# Patient Record
Sex: MALE | Race: WHITE | Employment: FULL TIME | URBAN - METROPOLITAN AREA
[De-identification: names, ages, dates, MRNs, and addresses within clinical notes are randomized per-mention and may not be internally consistent; named-entity substitution may affect disease eponyms.]

---

## 2017-05-09 NOTE — TELEPHONE ENCOUNTER
Please call pt and let him know that he was to recheck his lipid. His last lipid test was in July of 2016 and was told to repeat in 6 months. He also has a pending CMP test in there as well that he can get done.  Labs are already in system that were put in

## 2017-05-11 NOTE — TELEPHONE ENCOUNTER
LMOM for patient in regards to refill request and labs orders that are in the system and are due at this time.

## 2017-05-12 RX ORDER — ATORVASTATIN CALCIUM 10 MG/1
TABLET, FILM COATED ORAL
Qty: 30 TABLET | Refills: 0 | OUTPATIENT
Start: 2017-05-12

## 2017-05-25 ENCOUNTER — OFFICE VISIT (OUTPATIENT)
Dept: FAMILY MEDICINE CLINIC | Facility: CLINIC | Age: 47
End: 2017-05-25

## 2017-05-25 ENCOUNTER — APPOINTMENT (OUTPATIENT)
Dept: LAB | Age: 47
End: 2017-05-25
Attending: FAMILY MEDICINE
Payer: COMMERCIAL

## 2017-05-25 VITALS
HEART RATE: 94 BPM | SYSTOLIC BLOOD PRESSURE: 122 MMHG | BODY MASS INDEX: 40 KG/M2 | TEMPERATURE: 98 F | OXYGEN SATURATION: 95 % | RESPIRATION RATE: 15 BRPM | WEIGHT: 296 LBS | DIASTOLIC BLOOD PRESSURE: 82 MMHG

## 2017-05-25 DIAGNOSIS — E78.00 PURE HYPERCHOLESTEROLEMIA: ICD-10-CM

## 2017-05-25 DIAGNOSIS — H65.92 LEFT NON-SUPPURATIVE OTITIS MEDIA: ICD-10-CM

## 2017-05-25 DIAGNOSIS — J02.9 SORE THROAT: ICD-10-CM

## 2017-05-25 DIAGNOSIS — K12.2 UVULITIS: Primary | ICD-10-CM

## 2017-05-25 DIAGNOSIS — J01.10 ACUTE NON-RECURRENT FRONTAL SINUSITIS: ICD-10-CM

## 2017-05-25 PROCEDURE — 80053 COMPREHEN METABOLIC PANEL: CPT

## 2017-05-25 PROCEDURE — 80061 LIPID PANEL: CPT

## 2017-05-25 PROCEDURE — 36415 COLL VENOUS BLD VENIPUNCTURE: CPT

## 2017-05-25 PROCEDURE — 99213 OFFICE O/P EST LOW 20 MIN: CPT | Performed by: PHYSICIAN ASSISTANT

## 2017-05-25 PROCEDURE — 87880 STREP A ASSAY W/OPTIC: CPT | Performed by: PHYSICIAN ASSISTANT

## 2017-05-25 RX ORDER — AMOXICILLIN AND CLAVULANATE POTASSIUM 875; 125 MG/1; MG/1
1 TABLET, FILM COATED ORAL 2 TIMES DAILY
Qty: 20 TABLET | Refills: 0 | Status: SHIPPED | OUTPATIENT
Start: 2017-05-25 | End: 2017-06-04

## 2017-05-25 RX ORDER — PREDNISONE 20 MG/1
TABLET ORAL
Qty: 12 TABLET | Refills: 0 | Status: SHIPPED | OUTPATIENT
Start: 2017-05-25 | End: 2017-08-29

## 2017-05-25 NOTE — PROGRESS NOTES
CHIEF COMPLAINT:   Patient presents with:  Sore Throat: Pt c/o sore and swollen throat, cough and nasal congested       HPI:   Jacey Rothman is a 56year old male presents to clinic with symptoms of congestion for 4 days.  Admits to runny nose and sinus pres hour(s)). ASSESSMENT AND PLAN:       Sore throat/sinusiitis/otitis media   -     Rapid Strep negative in office   - Augmentin   - Please recheck if no improvement or if symptoms worsen       Uvulitis  -     predniSONE 20 MG Oral Tab;  Take one tab po TID

## 2017-05-26 ENCOUNTER — TELEPHONE (OUTPATIENT)
Dept: FAMILY MEDICINE CLINIC | Facility: CLINIC | Age: 47
End: 2017-05-26

## 2017-08-29 ENCOUNTER — OFFICE VISIT (OUTPATIENT)
Dept: FAMILY MEDICINE CLINIC | Facility: CLINIC | Age: 47
End: 2017-08-29

## 2017-08-29 VITALS
WEIGHT: 300 LBS | HEIGHT: 71.75 IN | BODY MASS INDEX: 41.08 KG/M2 | DIASTOLIC BLOOD PRESSURE: 62 MMHG | RESPIRATION RATE: 12 BRPM | TEMPERATURE: 98 F | SYSTOLIC BLOOD PRESSURE: 120 MMHG | HEART RATE: 91 BPM | OXYGEN SATURATION: 97 %

## 2017-08-29 DIAGNOSIS — K21.9 GASTROESOPHAGEAL REFLUX DISEASE, ESOPHAGITIS PRESENCE NOT SPECIFIED: ICD-10-CM

## 2017-08-29 DIAGNOSIS — Z00.00 ROUTINE ADULT HEALTH MAINTENANCE: Primary | ICD-10-CM

## 2017-08-29 DIAGNOSIS — IMO0001 CLASS 3 OBESITY DUE TO EXCESS CALORIES IN ADULT, UNSPECIFIED BMI, UNSPECIFIED WHETHER SERIOUS COMORBIDITY PRESENT: ICD-10-CM

## 2017-08-29 DIAGNOSIS — M79.672 PAIN OF BOTH HEELS: ICD-10-CM

## 2017-08-29 DIAGNOSIS — E78.00 PURE HYPERCHOLESTEROLEMIA: ICD-10-CM

## 2017-08-29 DIAGNOSIS — R06.83 SNORING: ICD-10-CM

## 2017-08-29 DIAGNOSIS — M79.671 PAIN OF BOTH HEELS: ICD-10-CM

## 2017-08-29 DIAGNOSIS — F32.A CHRONIC DEPRESSION: ICD-10-CM

## 2017-08-29 PROCEDURE — 99213 OFFICE O/P EST LOW 20 MIN: CPT | Performed by: FAMILY MEDICINE

## 2017-08-29 PROCEDURE — G0438 PPPS, INITIAL VISIT: HCPCS | Performed by: FAMILY MEDICINE

## 2017-08-29 PROCEDURE — 99396 PREV VISIT EST AGE 40-64: CPT | Performed by: FAMILY MEDICINE

## 2017-08-29 RX ORDER — ESCITALOPRAM OXALATE 20 MG/1
20 TABLET ORAL DAILY
Qty: 90 TABLET | Refills: 1 | Status: SHIPPED | OUTPATIENT
Start: 2017-08-29 | End: 2018-04-02

## 2017-08-29 RX ORDER — ATORVASTATIN CALCIUM 10 MG/1
TABLET, FILM COATED ORAL
Qty: 90 TABLET | Refills: 1 | Status: SHIPPED | OUTPATIENT
Start: 2017-08-29 | End: 2018-07-13

## 2017-09-07 NOTE — PROGRESS NOTES
Ivette Silvestre is a 56year old male who presents for a complete physical exam.   HPI:   Pt complains snoring off and on according to his wife. He also states that having bilateral heel pain and thinks he may have plantar fasciitis.   He also needs refill of Grandmother    • Cancer Maternal Grandmother      liver      Social History:  Smoking status: Never Smoker                                                              Smokeless tobacco: Never Used                      Alcohol use:  Yes              Comment edema, pedal and femoral pulses 2+ and symmetric b/l  GI: normoactive BS, non-distended, non-tender to palpation, no HSM/masses/pulsations  MUSCULOSKELETAL: Back with normal AROM, no joint swelling, extremities x 4 with normal strength 5/5 and symmetric an visit.           Sleep Med/testing - External      Podiatry Referral - In Network

## 2017-09-30 ENCOUNTER — OFFICE VISIT (OUTPATIENT)
Dept: SLEEP CENTER | Facility: HOSPITAL | Age: 47
End: 2017-09-30
Attending: INTERNAL MEDICINE
Payer: COMMERCIAL

## 2017-09-30 PROCEDURE — 95810 POLYSOM 6/> YRS 4/> PARAM: CPT

## 2017-10-04 NOTE — PROCEDURES
[address] [address]       Accredited by the [facility] of Sleep Medicine (AASM)    PATIENT'S NAME:        Salo CABELLO  ATTENDING PHYSICIAN:   Renée Ferreira M.D. REFERRING PHYSICIAN:   YONATHAN Chow apnea-hypopnea index of 30. There was no significant difference between supine and lateral sleep. The patient did have significant oxyhemoglobin desaturations with an oxygen saturation mindy of 88%.   He spent less than 1% of sleep time with oxygen levels

## 2018-03-07 ENCOUNTER — OFFICE VISIT (OUTPATIENT)
Dept: FAMILY MEDICINE CLINIC | Facility: CLINIC | Age: 48
End: 2018-03-07

## 2018-03-07 VITALS
RESPIRATION RATE: 16 BRPM | OXYGEN SATURATION: 97 % | WEIGHT: 301 LBS | HEART RATE: 76 BPM | SYSTOLIC BLOOD PRESSURE: 126 MMHG | DIASTOLIC BLOOD PRESSURE: 82 MMHG | BODY MASS INDEX: 40.77 KG/M2 | TEMPERATURE: 97 F | HEIGHT: 72 IN

## 2018-03-07 DIAGNOSIS — K21.9 GASTROESOPHAGEAL REFLUX DISEASE WITHOUT ESOPHAGITIS: Primary | ICD-10-CM

## 2018-03-07 DIAGNOSIS — G25.81 RESTLESS LEGS SYNDROME (RLS): ICD-10-CM

## 2018-03-07 DIAGNOSIS — R06.02 SOB (SHORTNESS OF BREATH): ICD-10-CM

## 2018-03-07 DIAGNOSIS — G47.33 OSA (OBSTRUCTIVE SLEEP APNEA): ICD-10-CM

## 2018-03-07 PROCEDURE — 93000 ELECTROCARDIOGRAM COMPLETE: CPT | Performed by: FAMILY MEDICINE

## 2018-03-07 PROCEDURE — 99214 OFFICE O/P EST MOD 30 MIN: CPT | Performed by: FAMILY MEDICINE

## 2018-03-07 RX ORDER — FERROUS SULFATE 325(65) MG
325 TABLET ORAL
Qty: 90 TABLET | Refills: 0 | Status: SHIPPED | OUTPATIENT
Start: 2018-03-07 | End: 2018-06-11

## 2018-03-07 NOTE — PATIENT INSTRUCTIONS
Understanding Restless Legs Syndrome    Are you ever annoyed by a creeping or itching feeling in your legs? Do you often feel an urge to move your legs while sitting or lying in bed? This can keep you from falling asleep at night.  You may then feel tired © 3365-4482 [employer] [address]. [address] [facility] [place] [facility] [place] [facility]. All rights reserved. This information is not intended as a substitute for professional medical care. Always follow your healthcare professional's instructions.         [place] · Manage stress and learn ways to relax. Deep breathing techniques and visualization can help to relax your muscles and calm your mind. · Exercise regularly. It can help reduce stress.  Also, you will have more energy during the day and be more tired at be The esophagus is a tube that carries food from the mouth to the stomach. A valve at the lower end of the esophagus prevents stomach acid from flowing upward.  When this valve doesn't work properly, stomach contents may repeatedly flow back up (reflux) into If needed, medicines can help relieve the symptoms of GERD and prevent damage to the esophagus. Discuss a medicine plan with your healthcare provider.  This may include one or more of the following medicines:  · Antacids to help neutralize the normal acids When you have GERD, stomach acid feels as if it’s backing up toward your mouth. Whether or not you take medicine to control your GERD, your symptoms can often be improved with lifestyle changes.  Talk to your healthcare provider about the following suggesti Dr. Summers [facility] [phone]

## 2018-03-07 NOTE — PROGRESS NOTES
[facility] Visit Note  3/7/2018      Subjective:      Patient ID: Dustin Smith is a 53year old male. Chief Complaint:  Patient presents with:  Test Results: Sleep Study results from 10/04/17.   Gastro-esophageal Reflux: dx with GERD age 28's s nasal turbinates. Neck:  No cervical lymphadenopathy  CV: Regular rate and rhythm. No murmurs, gallops, or rubs.   Lungs:  Clear to auscultation B, no wheezes, rales, or rhonchi, normal respiratory effort  Abd:  +bowel sounds, soft  Ext:  No pedal edema,

## 2018-03-30 ENCOUNTER — OFFICE VISIT (OUTPATIENT)
Dept: SLEEP CENTER | Facility: HOSPITAL | Age: 48
End: 2018-03-30
Attending: INTERNAL MEDICINE
Payer: COMMERCIAL

## 2018-03-30 PROCEDURE — 95811 POLYSOM 6/>YRS CPAP 4/> PARM: CPT

## 2018-04-02 DIAGNOSIS — F32.A CHRONIC DEPRESSION: ICD-10-CM

## 2018-04-03 RX ORDER — ESCITALOPRAM OXALATE 20 MG/1
TABLET ORAL
Qty: 90 TABLET | Refills: 0 | Status: SHIPPED | OUTPATIENT
Start: 2018-04-03 | End: 2018-07-13

## 2018-04-03 NOTE — TELEPHONE ENCOUNTER
Requesting Escitalopram 20 mg  LOV: 3/7/18  RTC: Return if symptoms worsen or fail to improve. Last Labs: n/a  Filled: 8/29/17#90 with 1 refills    No future appointments.     Non protocol med

## 2018-04-06 NOTE — PROCEDURES
[address]       Accredited by the [facility] of Sleep Medicine (AASM)    PATIENT'S NAME:        Linn CABELLO  ATTENDING PHYSICIAN:   Mary Nelson M.D. REFERRING PHYSICIAN:   Mary Nelson M.D.   PATIENT A 12%; stage REM, 27%. RESPIRATORY MEASURES:  The  patient was initiated on CPAP at 5 cm of water and titrated up to a final pressure of 11 cm of water. On this setting, patient was able to achieve stage REM sleep both in the supine and lateral position.

## 2018-06-11 PROBLEM — G47.33 OSA (OBSTRUCTIVE SLEEP APNEA): Status: ACTIVE | Noted: 2018-06-11

## 2018-07-13 DIAGNOSIS — E78.00 PURE HYPERCHOLESTEROLEMIA: ICD-10-CM

## 2018-07-13 DIAGNOSIS — F32.A CHRONIC DEPRESSION: ICD-10-CM

## 2018-07-13 RX ORDER — ESCITALOPRAM OXALATE 20 MG/1
TABLET ORAL
Qty: 90 TABLET | Refills: 0 | Status: SHIPPED | OUTPATIENT
Start: 2018-07-13 | End: 2018-10-22

## 2018-07-13 RX ORDER — ATORVASTATIN CALCIUM 10 MG/1
TABLET, FILM COATED ORAL
Qty: 90 TABLET | Refills: 0 | Status: SHIPPED | OUTPATIENT
Start: 2018-07-13 | End: 2018-10-22

## 2018-07-13 NOTE — TELEPHONE ENCOUNTER
Requested Medications   ATORVASTATIN 10MG TABLETS  Will file in chart as: ATORVASTATIN 10 MG Oral Tab  TAKE 1 TABLET BY MOUTH EVERY EVENING       Disp: 90 tablet Refills: 0    Class: Normal Start: 7/13/2018   For: Pure hypercholesterolemia  Originally orde

## 2018-07-13 NOTE — TELEPHONE ENCOUNTER
ESCITALOPRAM 20MG TABLETS  Will file in chart as: ESCITALOPRAM 20 MG Oral Tab  TAKE 1 TABLET(20 MG) BY MOUTH DAILY       Disp: 90 tablet Refills: 0    Class: Normal Start: 7/13/2018   For: Chronic depression  Originally ordered: 1 year ago by [employer]

## 2018-08-07 ENCOUNTER — OFFICE VISIT (OUTPATIENT)
Dept: FAMILY MEDICINE CLINIC | Facility: CLINIC | Age: 48
End: 2018-08-07

## 2018-08-07 VITALS
HEART RATE: 77 BPM | DIASTOLIC BLOOD PRESSURE: 68 MMHG | TEMPERATURE: 98 F | SYSTOLIC BLOOD PRESSURE: 110 MMHG | HEIGHT: 72 IN | WEIGHT: 305.38 LBS | BODY MASS INDEX: 41.36 KG/M2 | OXYGEN SATURATION: 96 % | RESPIRATION RATE: 18 BRPM

## 2018-08-07 DIAGNOSIS — M54.50 ACUTE BILATERAL LOW BACK PAIN WITHOUT SCIATICA: Primary | ICD-10-CM

## 2018-08-07 PROCEDURE — 99213 OFFICE O/P EST LOW 20 MIN: CPT | Performed by: NURSE PRACTITIONER

## 2018-08-07 RX ORDER — METHYLPREDNISOLONE 4 MG/1
TABLET ORAL
Qty: 1 KIT | Refills: 0 | Status: SHIPPED | OUTPATIENT
Start: 2018-08-07 | End: 2018-08-30

## 2018-08-07 RX ORDER — CYCLOBENZAPRINE HCL 10 MG
10 TABLET ORAL 3 TIMES DAILY PRN
Qty: 20 TABLET | Refills: 0 | Status: SHIPPED | OUTPATIENT
Start: 2018-08-07 | End: 2018-08-14

## 2018-08-07 NOTE — PROGRESS NOTES
CHIEF COMPLAINT:     Patient presents with:  Back Pain: pulled back doing yardwork x 3 days    HPI:   Milena Olivares is a 53year old male who is here for complaints of back pain. Pain is located at right low back, left low back.  Pain is described as dull, NEURO: No numbness or tingling. No loss of bowel or bladder control.     EXAM:   /68 (BP Location: Right arm, Patient Position: Sitting, Cuff Size: large)   Pulse 77   Temp 98.4 °F (36.9 °C) (Oral)   Resp 18   Ht 72\"   Wt (!) 305 lb 6.4 oz   SpO2 96 Sig: Take 1 tablet (10 mg total) by mouth 3 (three) times daily as needed for Muscle spasms. methylPREDNISolone (MEDROL) 4 MG Oral Tablet Therapy Pack 1 kit 0      Sig: As directed.              Patient Instructions     Back Pain (Acute or Chronic) Unless you had a physical injury (for example, a car accident or fall) X-rays are usually not needed for the initial evaluation of back pain. If pain continues and does not respond to medical treatment, X-rays and other tests may be needed.   Home care  Try Talk to your doctor before using medicine, especially if you have other medical problems or are taking other medicines. · You may use over-the-counter medicine as directed on the bottle to control pain, unless another pain medicine was prescribed.  If you

## 2018-08-07 NOTE — PATIENT INSTRUCTIONS
Back Pain (Acute or Chronic)    Back pain is one of the most common problems. The good news is that most people feel better in 1 to 2 weeks, and most of the rest in 1 to 2 months. Most people can remain active.   People who have pain describe it different Unless you had a physical injury (for example, a car accident or fall) X-rays are usually not needed for the initial evaluation of back pain. If pain continues and does not respond to medical treatment, X-rays and other tests may be needed.   Home care  Try Talk to your doctor before using medicine, especially if you have other medical problems or are taking other medicines. · You may use over-the-counter medicine as directed on the bottle to control pain, unless another pain medicine was prescribed.  If you

## 2018-08-30 ENCOUNTER — OFFICE VISIT (OUTPATIENT)
Dept: FAMILY MEDICINE CLINIC | Facility: CLINIC | Age: 48
End: 2018-08-30

## 2018-08-30 VITALS
WEIGHT: 308 LBS | RESPIRATION RATE: 14 BRPM | SYSTOLIC BLOOD PRESSURE: 130 MMHG | HEIGHT: 72 IN | OXYGEN SATURATION: 99 % | BODY MASS INDEX: 41.72 KG/M2 | DIASTOLIC BLOOD PRESSURE: 80 MMHG | TEMPERATURE: 98 F | HEART RATE: 80 BPM

## 2018-08-30 DIAGNOSIS — R53.83 FATIGUE, UNSPECIFIED TYPE: ICD-10-CM

## 2018-08-30 DIAGNOSIS — Z00.00 ROUTINE ADULT HEALTH MAINTENANCE: Primary | ICD-10-CM

## 2018-08-30 PROBLEM — G25.81 RESTLESS LEGS SYNDROME (RLS): Status: RESOLVED | Noted: 2018-03-07 | Resolved: 2018-08-30

## 2018-08-30 PROCEDURE — 99396 PREV VISIT EST AGE 40-64: CPT | Performed by: FAMILY MEDICINE

## 2018-08-30 RX ORDER — CYCLOBENZAPRINE HCL 10 MG
TABLET ORAL
Refills: 0 | COMMUNITY
Start: 2018-08-07 | End: 2018-08-30

## 2018-08-30 NOTE — PROGRESS NOTES
Meagan Craven is a 53year old male who presents for a complete physical exam.   HPI:   Pt complains of fatigue off and on. He also has a history of sleep apnea. On cpap now.  Getting used to it, does help, drastic improvement during the day, much more a symptoms  LUNGS: denies OK, wheezing, cough, orthopnea and PND  CARDIOVASCULAR: denies CP, palpitations, rapid or slow heart rate.  Denies SAMPSON/lower extremity swelling  GI: denies abdominal pain,denies heartburn, denies n/v/c/d/change in stools/blood in st disorder, average judgement and insight      Immunization History  Administered            Date(s) Administered    TDAP                  01/01/2010      ASSESSMENT AND PLAN:   Helio Reyes is a 53year old male who presents for a complete physical exam.

## 2018-08-31 PROBLEM — R53.83 FATIGUE: Status: ACTIVE | Noted: 2018-08-31

## 2018-10-22 DIAGNOSIS — F32.A CHRONIC DEPRESSION: ICD-10-CM

## 2018-10-22 DIAGNOSIS — E78.00 PURE HYPERCHOLESTEROLEMIA: ICD-10-CM

## 2018-10-23 DIAGNOSIS — E78.00 PURE HYPERCHOLESTEROLEMIA: ICD-10-CM

## 2018-10-23 RX ORDER — ESCITALOPRAM OXALATE 20 MG/1
TABLET ORAL
Qty: 90 TABLET | Refills: 0 | Status: SHIPPED | OUTPATIENT
Start: 2018-10-23 | End: 2019-03-22

## 2018-10-23 RX ORDER — ATORVASTATIN CALCIUM 10 MG/1
10 TABLET, FILM COATED ORAL NIGHTLY
Qty: 30 TABLET | Refills: 0 | Status: SHIPPED | OUTPATIENT
Start: 2018-10-23 | End: 2019-02-19

## 2018-10-23 NOTE — TELEPHONE ENCOUNTER
Requesting Atorvastatin and Escitalopram  LOV: 8/30/18  RTC: not noted  Last Relevant Labs: 5/25/17  Filled: 7/13/18 #90 with 0 refills    No future appointments. Patient is overdue for labs. 30 day sent with note to get labs done.   Escitalopram is non

## 2018-10-24 RX ORDER — ATORVASTATIN CALCIUM 10 MG/1
TABLET, FILM COATED ORAL
Qty: 90 TABLET | Refills: 0 | OUTPATIENT
Start: 2018-10-24

## 2018-10-24 NOTE — TELEPHONE ENCOUNTER
Requesting Atorvastatin  LOV: 8/30/18  RTC: not noted  Last Relevant Labs: 5/25/17  Filled: 10/23/18 #30 with 0 refills    No future appointments. Given 30 day yesterday as he is due for labs now.   Denied 90 day request

## 2019-02-16 ENCOUNTER — APPOINTMENT (OUTPATIENT)
Dept: LAB | Age: 49
End: 2019-02-16
Attending: FAMILY MEDICINE
Payer: COMMERCIAL

## 2019-02-16 DIAGNOSIS — R53.83 FATIGUE, UNSPECIFIED TYPE: ICD-10-CM

## 2019-02-16 DIAGNOSIS — Z00.00 ROUTINE ADULT HEALTH MAINTENANCE: ICD-10-CM

## 2019-02-16 LAB
ALBUMIN SERPL-MCNC: 4.1 G/DL (ref 3.4–5)
ALBUMIN/GLOB SERPL: 1.1 {RATIO} (ref 1–2)
ALP LIVER SERPL-CCNC: 67 U/L (ref 45–117)
ALT SERPL-CCNC: 26 U/L (ref 16–61)
ANION GAP SERPL CALC-SCNC: 5 MMOL/L (ref 0–18)
AST SERPL-CCNC: 16 U/L (ref 15–37)
BILIRUB SERPL-MCNC: 0.6 MG/DL (ref 0.1–2)
BUN BLD-MCNC: 13 MG/DL (ref 7–18)
BUN/CREAT SERPL: 18.1 (ref 10–20)
CALCIUM BLD-MCNC: 9.1 MG/DL (ref 8.5–10.1)
CHLORIDE SERPL-SCNC: 106 MMOL/L (ref 98–107)
CHOLEST SMN-MCNC: 186 MG/DL (ref ?–200)
CO2 SERPL-SCNC: 29 MMOL/L (ref 21–32)
COMPLEXED PSA SERPL-MCNC: 0.71 NG/ML (ref ?–4)
CREAT BLD-MCNC: 0.72 MG/DL (ref 0.7–1.3)
DEPRECATED RDW RBC AUTO: 39.9 FL (ref 35.1–46.3)
ERYTHROCYTE [DISTWIDTH] IN BLOOD BY AUTOMATED COUNT: 13 % (ref 11–15)
GLOBULIN PLAS-MCNC: 3.9 G/DL (ref 2.8–4.4)
GLUCOSE BLD-MCNC: 86 MG/DL (ref 70–99)
HCT VFR BLD AUTO: 42.3 % (ref 39–53)
HDLC SERPL-MCNC: 46 MG/DL (ref 40–59)
HGB BLD-MCNC: 13.7 G/DL (ref 13–17.5)
LDLC SERPL CALC-MCNC: 103 MG/DL (ref ?–100)
M PROTEIN MFR SERPL ELPH: 8 G/DL (ref 6.4–8.2)
MCH RBC QN AUTO: 27.6 PG (ref 26–34)
MCHC RBC AUTO-ENTMCNC: 32.4 G/DL (ref 31–37)
MCV RBC AUTO: 85.1 FL (ref 80–100)
NONHDLC SERPL-MCNC: 140 MG/DL (ref ?–130)
OSMOLALITY SERPL CALC.SUM OF ELEC: 289 MOSM/KG (ref 275–295)
PLATELET # BLD AUTO: 244 10(3)UL (ref 150–450)
POTASSIUM SERPL-SCNC: 4.3 MMOL/L (ref 3.5–5.1)
RBC # BLD AUTO: 4.97 X10(6)UL (ref 4.3–5.7)
SODIUM SERPL-SCNC: 140 MMOL/L (ref 136–145)
T4 FREE SERPL-MCNC: 0.9 NG/DL (ref 0.8–1.7)
TRIGL SERPL-MCNC: 183 MG/DL (ref 30–149)
TSI SER-ACNC: 1.32 MIU/ML (ref 0.36–3.74)
VIT D+METAB SERPL-MCNC: 24.1 NG/ML (ref 30–100)
VLDLC SERPL CALC-MCNC: 37 MG/DL (ref 0–30)
WBC # BLD AUTO: 7.6 X10(3) UL (ref 4–11)

## 2019-02-16 PROCEDURE — 82306 VITAMIN D 25 HYDROXY: CPT

## 2019-02-16 PROCEDURE — 80061 LIPID PANEL: CPT

## 2019-02-16 PROCEDURE — 36415 COLL VENOUS BLD VENIPUNCTURE: CPT

## 2019-02-16 PROCEDURE — 84439 ASSAY OF FREE THYROXINE: CPT

## 2019-02-16 PROCEDURE — 84443 ASSAY THYROID STIM HORMONE: CPT

## 2019-02-16 PROCEDURE — 80053 COMPREHEN METABOLIC PANEL: CPT

## 2019-02-16 PROCEDURE — 85027 COMPLETE CBC AUTOMATED: CPT

## 2019-02-19 DIAGNOSIS — E55.9 VITAMIN D DEFICIENCY: ICD-10-CM

## 2019-02-19 DIAGNOSIS — E78.00 PURE HYPERCHOLESTEROLEMIA: ICD-10-CM

## 2019-02-19 DIAGNOSIS — E78.2 ELEVATED CHOLESTEROL WITH ELEVATED TRIGLYCERIDES: Primary | ICD-10-CM

## 2019-02-19 RX ORDER — ATORVASTATIN CALCIUM 10 MG/1
10 TABLET, FILM COATED ORAL NIGHTLY
Qty: 90 TABLET | Refills: 1 | Status: SHIPPED | OUTPATIENT
Start: 2019-02-19 | End: 2019-11-04

## 2019-02-19 NOTE — TELEPHONE ENCOUNTER
Cholesterol Medication Protocol Passed2/19 10:18 AM   ALT < 80    ALT resulted within past year    Lipid panel within past 12 months    Appointment within past 12 or next 3 months     Requesting atorvastatin 10mg  LOV: 8/30/18  RTC:   Last Relevant Labs: 2

## 2019-03-22 DIAGNOSIS — F32.A CHRONIC DEPRESSION: ICD-10-CM

## 2019-03-22 RX ORDER — ESCITALOPRAM OXALATE 20 MG/1
20 TABLET ORAL DAILY
Qty: 90 TABLET | Refills: 0 | Status: SHIPPED | OUTPATIENT
Start: 2019-03-22 | End: 2019-06-22

## 2019-03-22 NOTE — TELEPHONE ENCOUNTER
Requested Medications      escitalopram 20 MG Oral Tab         Sig: N/A    Disp:  90 tablet    Refills:  0    Start: 3/22/2019    Class: Normal    Non-formulary For: Chronic depression    Last ordered: 5 months ago by Noemi Travis MD        To be filled

## 2019-05-28 ENCOUNTER — HOSPITAL ENCOUNTER (OUTPATIENT)
Dept: CT IMAGING | Age: 49
Discharge: HOME | End: 2019-05-28
Attending: FAMILY MEDICINE

## 2019-05-28 DIAGNOSIS — Z13.9 VISIT FOR SCREENING: ICD-10-CM

## 2019-05-28 NOTE — PROGRESS NOTES
Imaging report reviewed and shows no concerning findings.  Please notify patient.    -Dr. Kimberly Dietz

## 2019-06-22 DIAGNOSIS — F32.A CHRONIC DEPRESSION: ICD-10-CM

## 2019-06-25 DIAGNOSIS — F32.A CHRONIC DEPRESSION: ICD-10-CM

## 2019-06-25 RX ORDER — ESCITALOPRAM OXALATE 20 MG/1
TABLET ORAL
Qty: 90 TABLET | Refills: 0 | OUTPATIENT
Start: 2019-06-25

## 2019-06-25 RX ORDER — ESCITALOPRAM OXALATE 20 MG/1
20 TABLET ORAL DAILY
Qty: 90 TABLET | Refills: 0 | Status: SHIPPED | OUTPATIENT
Start: 2019-06-25 | End: 2019-11-04

## 2019-06-25 NOTE — TELEPHONE ENCOUNTER
Requesting escitalopram 20 MG Oral Tab  LOV: 8/30/18 Dr Karyn Hills  RTC: none stated   Last Labs:   Filled: 3/22/19#90 with 0 refills    No future appointments.     PT will be due for annual exam 8/30/19  Note added to rx to schedule annual exam in Aug

## 2019-11-04 ENCOUNTER — OFFICE VISIT (OUTPATIENT)
Dept: FAMILY MEDICINE CLINIC | Facility: CLINIC | Age: 49
End: 2019-11-04

## 2019-11-04 VITALS
HEART RATE: 74 BPM | BODY MASS INDEX: 38.52 KG/M2 | HEIGHT: 72 IN | WEIGHT: 284.38 LBS | TEMPERATURE: 98 F | SYSTOLIC BLOOD PRESSURE: 120 MMHG | RESPIRATION RATE: 16 BRPM | DIASTOLIC BLOOD PRESSURE: 80 MMHG

## 2019-11-04 DIAGNOSIS — E78.00 PURE HYPERCHOLESTEROLEMIA: ICD-10-CM

## 2019-11-04 DIAGNOSIS — F32.A CHRONIC DEPRESSION: ICD-10-CM

## 2019-11-04 DIAGNOSIS — Z00.00 ROUTINE GENERAL MEDICAL EXAMINATION AT A HEALTH CARE FACILITY: Primary | ICD-10-CM

## 2019-11-04 PROCEDURE — 90686 IIV4 VACC NO PRSV 0.5 ML IM: CPT | Performed by: FAMILY MEDICINE

## 2019-11-04 PROCEDURE — 90471 IMMUNIZATION ADMIN: CPT | Performed by: FAMILY MEDICINE

## 2019-11-04 PROCEDURE — 99396 PREV VISIT EST AGE 40-64: CPT | Performed by: FAMILY MEDICINE

## 2019-11-04 PROCEDURE — G0438 PPPS, INITIAL VISIT: HCPCS | Performed by: FAMILY MEDICINE

## 2019-11-04 RX ORDER — ATORVASTATIN CALCIUM 10 MG/1
10 TABLET, FILM COATED ORAL NIGHTLY
Qty: 90 TABLET | Refills: 1 | Status: SHIPPED | OUTPATIENT
Start: 2019-11-04 | End: 2020-06-05

## 2019-11-04 RX ORDER — ESCITALOPRAM OXALATE 20 MG/1
20 TABLET ORAL DAILY
Qty: 90 TABLET | Refills: 1 | Status: SHIPPED | OUTPATIENT
Start: 2019-11-04 | End: 2020-06-02

## 2019-11-04 NOTE — PROGRESS NOTES
Saurabh Coelho is a 51year old male who presents for a complete physical exam.   HPI:   Pt complains of nothing.   Urination changes no  ED symptoms no  Immunizations needed no  Wt Readings from Last 6 Encounters:  11/04/19 : 284 lb 6.4 oz (129 kg)  08/30/18 13.0 - 17.5 g/dL    HCT 42.3 39.0 - 53.0 %    .0 150.0 - 450.0 10(3)uL    MCV 85.1 80.0 - 100.0 fL    MCH 27.6 26.0 - 34.0 pg    MCHC 32.4 31.0 - 37.0 g/dL    RDW 13.0 11.0 - 15.0 %    RDW-SD 39.9 35.1 - 46.3 fL       Current Outpatient Medications depression or anxiety  HEMATOLOGIC: denies easy bruising/bleeding/anemia/blood clot disorders  ENDOCRINE: denies weight gain/weight loss/enlargement of neck glands/polyuria/polydypsia  ALL/ASTHMA: denies allergic rhinitis/asthma    EXAM:   /80 (BP Lo meats.    Aerobic exercise 30 minutes five days a week for cardiovascular fitness and 45-60 minutes 6-7 days a week for weight loss. Advised testicular self exam once a month.     Above age 28-36, patient was told to have a heart scan done for coronary kirill

## 2019-11-06 ENCOUNTER — LAB ENCOUNTER (OUTPATIENT)
Dept: LAB | Age: 49
End: 2019-11-06
Attending: FAMILY MEDICINE
Payer: COMMERCIAL

## 2019-11-06 DIAGNOSIS — Z00.00 ROUTINE GENERAL MEDICAL EXAMINATION AT A HEALTH CARE FACILITY: ICD-10-CM

## 2019-11-06 PROCEDURE — 84439 ASSAY OF FREE THYROXINE: CPT

## 2019-11-06 PROCEDURE — 82306 VITAMIN D 25 HYDROXY: CPT

## 2019-11-06 PROCEDURE — 80061 LIPID PANEL: CPT

## 2019-11-06 PROCEDURE — 84443 ASSAY THYROID STIM HORMONE: CPT

## 2019-11-06 PROCEDURE — 80053 COMPREHEN METABOLIC PANEL: CPT

## 2019-11-06 PROCEDURE — 85025 COMPLETE CBC W/AUTO DIFF WBC: CPT

## 2019-11-06 PROCEDURE — 36415 COLL VENOUS BLD VENIPUNCTURE: CPT

## 2020-05-30 DIAGNOSIS — F32.A CHRONIC DEPRESSION: ICD-10-CM

## 2020-06-01 RX ORDER — ESCITALOPRAM OXALATE 20 MG/1
TABLET ORAL
Qty: 90 TABLET | Refills: 1 | OUTPATIENT
Start: 2020-06-01

## 2020-06-01 NOTE — TELEPHONE ENCOUNTER
Requested Prescriptions     Pending Prescriptions Disp Refills   • ESCITALOPRAM 20 MG Oral Tab [Pharmacy Med Name: ESCITALOPRAM 20MG TABLETS] 90 tablet 1     Sig: TAKE 1 TABLET BY MOUTH ONCE DAILY       LOV: 11/04/19 for physical   Filled: 11/4/19 #90 with

## 2020-06-02 DIAGNOSIS — F32.A CHRONIC DEPRESSION: ICD-10-CM

## 2020-06-03 ENCOUNTER — PATIENT MESSAGE (OUTPATIENT)
Dept: FAMILY MEDICINE CLINIC | Facility: CLINIC | Age: 50
End: 2020-06-03

## 2020-06-03 RX ORDER — ESCITALOPRAM OXALATE 20 MG/1
20 TABLET ORAL DAILY
Qty: 90 TABLET | Refills: 0 | Status: SHIPPED | OUTPATIENT
Start: 2020-06-03 | End: 2020-06-05

## 2020-06-03 NOTE — TELEPHONE ENCOUNTER
From: Yuval Barakat  To: Miranda Arnold MD  Sent: 6/3/2020 12:09 PM CDT  Subject: Prescription Question    My lexapro generic prescription has been denied. Can you tell me why or if I need to do something.     Thanks

## 2020-06-05 ENCOUNTER — VIRTUAL PHONE E/M (OUTPATIENT)
Dept: FAMILY MEDICINE CLINIC | Facility: CLINIC | Age: 50
End: 2020-06-05

## 2020-06-05 DIAGNOSIS — F32.A CHRONIC DEPRESSION: ICD-10-CM

## 2020-06-05 DIAGNOSIS — E78.00 PURE HYPERCHOLESTEROLEMIA: ICD-10-CM

## 2020-06-05 DIAGNOSIS — E55.9 VITAMIN D DEFICIENCY: Primary | ICD-10-CM

## 2020-06-05 PROCEDURE — 99213 OFFICE O/P EST LOW 20 MIN: CPT | Performed by: FAMILY MEDICINE

## 2020-06-05 RX ORDER — ESCITALOPRAM OXALATE 20 MG/1
20 TABLET ORAL DAILY
Qty: 90 TABLET | Refills: 2 | Status: SHIPPED | OUTPATIENT
Start: 2020-06-05 | End: 2020-11-09

## 2020-06-05 RX ORDER — ATORVASTATIN CALCIUM 10 MG/1
10 TABLET, FILM COATED ORAL NIGHTLY
Qty: 90 TABLET | Refills: 2 | Status: SHIPPED | OUTPATIENT
Start: 2020-06-05 | End: 2021-05-24

## 2020-06-05 NOTE — PROGRESS NOTES
TELEMEDICINE VISIT by phone  This visit is conducted using Telemedicine with live, interactive audio.      Verification of patient identity was established by the  patient (s)  Heath Barakat verbally consents to a telemedicine daily.  Dispense: 90 tablet; Refill: 2    2. Pure hypercholesterolemia  - atorvastatin 10 MG Oral Tab; Take 1 tablet (10 mg total) by mouth nightly. TAKE 1 TABLET BY MOUTH EVERY EVENING  Dispense: 90 tablet; Refill: 2  - COMP METABOLIC PANEL (14);  Future

## 2020-08-12 ENCOUNTER — APPOINTMENT (OUTPATIENT)
Dept: GENERAL RADIOLOGY | Age: 50
End: 2020-08-12
Attending: PHYSICIAN ASSISTANT
Payer: COMMERCIAL

## 2020-08-12 ENCOUNTER — HOSPITAL ENCOUNTER (OUTPATIENT)
Age: 50
Discharge: HOME | End: 2020-08-12
Payer: COMMERCIAL

## 2020-08-12 VITALS
RESPIRATION RATE: 16 BRPM | OXYGEN SATURATION: 97 % | HEART RATE: 71 BPM | TEMPERATURE: 98 F | SYSTOLIC BLOOD PRESSURE: 136 MMHG | DIASTOLIC BLOOD PRESSURE: 81 MMHG

## 2020-08-12 DIAGNOSIS — S92.912A CLOSED DISPLACED FRACTURE OF PHALANX OF TOE OF LEFT FOOT, UNSPECIFIED TOE, INITIAL ENCOUNTER: ICD-10-CM

## 2020-08-12 DIAGNOSIS — S92.502A CLOSED DISPLACED FRACTURE OF PHALANX OF LESSER TOE OF LEFT FOOT, UNSPECIFIED PHALANX, INITIAL ENCOUNTER: Primary | ICD-10-CM

## 2020-08-12 PROCEDURE — 73630 X-RAY EXAM OF FOOT: CPT | Performed by: PHYSICIAN ASSISTANT

## 2020-08-12 PROCEDURE — 99213 OFFICE O/P EST LOW 20 MIN: CPT

## 2020-08-12 PROCEDURE — 99203 OFFICE O/P NEW LOW 30 MIN: CPT

## 2020-08-12 NOTE — ED PROVIDER NOTES
Patient Seen in: THE [facility]      History   Patient presents with:   Toe Injury    Stated Complaint: Left left toe injury     HPI    27-year-old male here with complaint of pain swelling and bruising to his left foot fourth and fi (Temporal)   Resp 16   SpO2 97%         Physical Exam  Vitals signs and nursing note reviewed. Constitutional:       Appearance: He is well-developed. HENT:      Head: Normocephalic.       Right Ear: Tympanic membrane and external ear normal.      Left of separation of the fracture fragments. These do not extend to the articular surface. Surrounding soft tissue swelling. No dislocation. Incidental note of mild enthesopathy involving the posterior and inferior calcaneus. CONCLUSION:  1.  Extra-

## 2020-08-13 ENCOUNTER — PATIENT MESSAGE (OUTPATIENT)
Dept: FAMILY MEDICINE CLINIC | Facility: CLINIC | Age: 50
End: 2020-08-13

## 2020-08-13 DIAGNOSIS — S99.209A: Primary | ICD-10-CM

## 2020-08-13 NOTE — TELEPHONE ENCOUNTER
From: Homer Barakat  To: Naa Badillo MD  Sent: 8/13/2020 1:06 PM CDT  Subject: Non-Urgent Medical Question    Yesterday I broke my 4th and 5th toe on my left foot.  I had X-rays at immediate care in [facility] and they taped it up and suggested a follow

## 2020-08-14 ENCOUNTER — TELEPHONE (OUTPATIENT)
Dept: FAMILY MEDICINE CLINIC | Facility: CLINIC | Age: 50
End: 2020-08-14

## 2020-08-14 DIAGNOSIS — S99.922A INJURY OF TOE ON LEFT FOOT, INITIAL ENCOUNTER: Primary | ICD-10-CM

## 2020-08-14 NOTE — TELEPHONE ENCOUNTER
Pt was recently seen in the  for toe injury. Was referred to a podiatrist. That podiatrist is not included in pt's network. Pt is asking for a new podiatrist from PCP.

## 2020-08-14 NOTE — TELEPHONE ENCOUNTER
Pt was seen at [facility] for a toe injury and was referral to Podiatry, Dr. Sonya Guerra. Pt states Dr. Sonya Guerra does not take his insurance.     Referral pended for Dr. Dung Camara if ok

## 2020-11-08 DIAGNOSIS — F32.A CHRONIC DEPRESSION: ICD-10-CM

## 2020-11-09 DIAGNOSIS — Z00.00 ROUTINE ADULT HEALTH MAINTENANCE: ICD-10-CM

## 2020-11-09 DIAGNOSIS — E55.9 VITAMIN D DEFICIENCY: Primary | ICD-10-CM

## 2020-11-09 DIAGNOSIS — F32.A CHRONIC DEPRESSION: ICD-10-CM

## 2020-11-09 RX ORDER — ESCITALOPRAM OXALATE 20 MG/1
20 TABLET ORAL DAILY
Qty: 90 TABLET | Refills: 0 | Status: SHIPPED | OUTPATIENT
Start: 2020-11-09 | End: 2022-01-28

## 2020-11-09 NOTE — TELEPHONE ENCOUNTER
escitalopram 20 MG Oral Tab           Possible duplicate: Amanda to review recent actions on this medication    Sig: Take 1 tablet (20 mg total) by mouth daily.     Disp:  90 tablet    Refills:  2    Start: 11/9/2020    Class: Normal    Non-formulary For: Amira

## 2020-11-10 RX ORDER — ESCITALOPRAM OXALATE 20 MG/1
20 TABLET ORAL DAILY
Qty: 90 TABLET | Refills: 2 | OUTPATIENT
Start: 2020-11-10

## 2020-11-10 NOTE — TELEPHONE ENCOUNTER
Future Appointments   Date Time Provider Nancy Trujillo   11/20/2020 11:00 AM So Martel MD [facility] [address] [facility] [address]

## 2020-11-20 ENCOUNTER — OFFICE VISIT (OUTPATIENT)
Dept: FAMILY MEDICINE CLINIC | Facility: CLINIC | Age: 50
End: 2020-11-20
Payer: COMMERCIAL

## 2020-11-20 ENCOUNTER — LAB ENCOUNTER (OUTPATIENT)
Dept: LAB | Age: 50
End: 2020-11-20
Attending: FAMILY MEDICINE
Payer: COMMERCIAL

## 2020-11-20 VITALS
HEIGHT: 70.87 IN | WEIGHT: 298 LBS | OXYGEN SATURATION: 97 % | SYSTOLIC BLOOD PRESSURE: 120 MMHG | BODY MASS INDEX: 41.72 KG/M2 | DIASTOLIC BLOOD PRESSURE: 84 MMHG | TEMPERATURE: 99 F | RESPIRATION RATE: 16 BRPM | HEART RATE: 78 BPM

## 2020-11-20 DIAGNOSIS — Z00.00 ROUTINE ADULT HEALTH MAINTENANCE: ICD-10-CM

## 2020-11-20 DIAGNOSIS — Z00.00 ROUTINE ADULT HEALTH MAINTENANCE: Primary | ICD-10-CM

## 2020-11-20 DIAGNOSIS — E78.00 PURE HYPERCHOLESTEROLEMIA: ICD-10-CM

## 2020-11-20 DIAGNOSIS — E55.9 VITAMIN D DEFICIENCY: ICD-10-CM

## 2020-11-20 PROCEDURE — 80053 COMPREHEN METABOLIC PANEL: CPT

## 2020-11-20 PROCEDURE — 99396 PREV VISIT EST AGE 40-64: CPT | Performed by: FAMILY MEDICINE

## 2020-11-20 PROCEDURE — 85027 COMPLETE CBC AUTOMATED: CPT

## 2020-11-20 PROCEDURE — 80061 LIPID PANEL: CPT

## 2020-11-20 PROCEDURE — 3079F DIAST BP 80-89 MM HG: CPT | Performed by: FAMILY MEDICINE

## 2020-11-20 PROCEDURE — 84439 ASSAY OF FREE THYROXINE: CPT

## 2020-11-20 PROCEDURE — 36415 COLL VENOUS BLD VENIPUNCTURE: CPT

## 2020-11-20 PROCEDURE — 82306 VITAMIN D 25 HYDROXY: CPT

## 2020-11-20 PROCEDURE — 3008F BODY MASS INDEX DOCD: CPT | Performed by: FAMILY MEDICINE

## 2020-11-20 PROCEDURE — 84443 ASSAY THYROID STIM HORMONE: CPT

## 2020-11-20 PROCEDURE — 3074F SYST BP LT 130 MM HG: CPT | Performed by: FAMILY MEDICINE

## 2020-11-22 NOTE — PROGRESS NOTES
Akin Echevarria is a 53year old male who presents for a complete physical exam.   HPI:   Pt complains of nothing.   Urination changes no  ED symptoms no  Immunizations needed no  Wt Readings from Last 6 Encounters:  11/20/20 : 298 lb (135.2 kg)  11/04/19 : 28 x10(6)uL    HGB 14.0 13.0 - 17.5 g/dL    HCT 42.5 39.0 - 53.0 %    .0 150.0 - 450.0 10(3)uL    MCV 85.0 80.0 - 100.0 fL    MCH 28.0 26.0 - 34.0 pg    MCHC 32.9 31.0 - 37.0 g/dL    RDW 13.0 11.0 - 15.0 %    RDW-SD 39.6 35.1 - 46.3 fL    Neutrophil Ab symptoms  LUNGS: denies OK, wheezing, cough, orthopnea and PND  CARDIOVASCULAR: denies CP, palpitations, rapid or slow heart rate.  Denies SAMPSON/lower extremity swelling  GI: denies abdominal pain,denies heartburn, denies n/v/c/d/change in stools/blood in st thought disorder, average judgement and insight      Immunization History  Administered            Date(s) Administered    FLULAVAL 6 months & older 0.5 ml Prefilled syringe ([id number])                          11/04/2019      TDAP                  01/01/2010

## 2021-04-20 ENCOUNTER — IMMUNIZATION (OUTPATIENT)
Dept: LAB | Age: 51
End: 2021-04-20
Attending: HOSPITALIST
Payer: COMMERCIAL

## 2021-04-20 DIAGNOSIS — Z23 NEED FOR VACCINATION: Primary | ICD-10-CM

## 2021-04-20 PROCEDURE — 0001A SARSCOV2 VAC 30MCG/0.3ML IM: CPT

## 2021-05-11 ENCOUNTER — IMMUNIZATION (OUTPATIENT)
Dept: LAB | Age: 51
End: 2021-05-11
Attending: HOSPITALIST
Payer: COMMERCIAL

## 2021-05-11 DIAGNOSIS — Z23 NEED FOR VACCINATION: Primary | ICD-10-CM

## 2021-05-11 PROCEDURE — 0002A SARSCOV2 VAC 30MCG/0.3ML IM: CPT

## 2021-05-24 DIAGNOSIS — E78.00 PURE HYPERCHOLESTEROLEMIA: ICD-10-CM

## 2021-05-24 RX ORDER — ATORVASTATIN CALCIUM 10 MG/1
10 TABLET, FILM COATED ORAL NIGHTLY
Qty: 90 TABLET | Refills: 2 | Status: SHIPPED | OUTPATIENT
Start: 2021-05-24

## 2021-05-24 NOTE — TELEPHONE ENCOUNTER
Requesting atorvastatin 10 MG  LOV: 11/20/2020 w/Dr. Leighton Nicole for annual  RTC: prn-due for colonoscopy   Last Relevant Labs: 11/20/2020  Filled: 06/05/2020 #90 with 2 refills       Medication pended and routed to PCP for approval or denial.    No future appo

## 2021-06-23 ENCOUNTER — TELEPHONE (OUTPATIENT)
Dept: FAMILY MEDICINE CLINIC | Facility: CLINIC | Age: 51
End: 2021-06-23

## 2021-06-23 DIAGNOSIS — Z12.11 ENCOUNTER FOR SCREENING COLONOSCOPY: Primary | ICD-10-CM

## 2021-06-23 NOTE — TELEPHONE ENCOUNTER
Testing reviewed from 5/7/2021 for testing showing negative for occult blood. I still recommend that patient get colonoscopy done for colon cancer screening. Please refer to GI.  REPORT in nursing basket.

## 2021-11-20 ENCOUNTER — OFFICE VISIT (OUTPATIENT)
Dept: FAMILY MEDICINE CLINIC | Facility: CLINIC | Age: 51
End: 2021-11-20
Payer: COMMERCIAL

## 2021-11-20 VITALS
OXYGEN SATURATION: 99 % | SYSTOLIC BLOOD PRESSURE: 122 MMHG | DIASTOLIC BLOOD PRESSURE: 70 MMHG | HEIGHT: 72 IN | BODY MASS INDEX: 38.92 KG/M2 | HEART RATE: 66 BPM | WEIGHT: 287.38 LBS | TEMPERATURE: 98 F | RESPIRATION RATE: 16 BRPM

## 2021-11-20 DIAGNOSIS — Z00.00 ROUTINE ADULT HEALTH MAINTENANCE: Primary | ICD-10-CM

## 2021-11-20 DIAGNOSIS — L98.9 SKIN LESION: ICD-10-CM

## 2021-11-20 DIAGNOSIS — E78.00 PURE HYPERCHOLESTEROLEMIA: ICD-10-CM

## 2021-11-20 DIAGNOSIS — Z23 NEED FOR VACCINATION: ICD-10-CM

## 2021-11-20 PROCEDURE — 3078F DIAST BP <80 MM HG: CPT | Performed by: FAMILY MEDICINE

## 2021-11-20 PROCEDURE — 3074F SYST BP LT 130 MM HG: CPT | Performed by: FAMILY MEDICINE

## 2021-11-20 PROCEDURE — 90686 IIV4 VACC NO PRSV 0.5 ML IM: CPT | Performed by: FAMILY MEDICINE

## 2021-11-20 PROCEDURE — 99396 PREV VISIT EST AGE 40-64: CPT | Performed by: FAMILY MEDICINE

## 2021-11-20 PROCEDURE — 3008F BODY MASS INDEX DOCD: CPT | Performed by: FAMILY MEDICINE

## 2021-11-20 PROCEDURE — 90472 IMMUNIZATION ADMIN EACH ADD: CPT | Performed by: FAMILY MEDICINE

## 2021-11-20 PROCEDURE — 90750 HZV VACC RECOMBINANT IM: CPT | Performed by: FAMILY MEDICINE

## 2021-11-20 PROCEDURE — 90471 IMMUNIZATION ADMIN: CPT | Performed by: FAMILY MEDICINE

## 2021-11-23 NOTE — PROGRESS NOTES
Dl Bettencourt is a 49year old male who presents for a complete physical exam.   HPI:   Patient states that having a raised lesion on his left forearm on the flexor surface which she would like to see Derm for.   Urination changes no  ED symptoms no  Immuniz HGB 13.4 13.0 - 17.5 g/dL    HCT 40.8 39.0 - 53.0 %    .0 150.0 - 450.0 10(3)uL    MCV 85.0 80.0 - 100.0 fL    MCH 27.9 26.0 - 34.0 pg    MCHC 32.8 31.0 - 37.0 g/dL    RDW 13.2 11.0 - 15.0 %    RDW-SD 41.2 35.1 - 46.3 fL       Current Outpatient Med urethra  MUSCULOSKELETAL: denies joint or muscle aches or pains  NEURO: denies headaches/dizziness  PSYCH: denies depression or anxiety  HEMATOLOGIC: denies easy bruising/bleeding/anemia/blood clot disorders  ENDOCRINE: denies weight gain/weight loss/enlar 11/04/2019  10/16/2020      Influenza             10/16/2020      Influenza(Afluria)0.5ml QIV PFS 3yr & older                          10/16/2020      TDAP                  01/01/2010      Zoster Vaccine Recombinant Adjuvanted (Shingrix)

## 2021-12-18 ENCOUNTER — IMMUNIZATION (OUTPATIENT)
Dept: LAB | Facility: HOSPITAL | Age: 51
End: 2021-12-18
Attending: EMERGENCY MEDICINE
Payer: COMMERCIAL

## 2021-12-18 DIAGNOSIS — Z23 NEED FOR VACCINATION: Primary | ICD-10-CM

## 2021-12-18 PROCEDURE — 0064A SARSCOV2 VAC 50MCG/0.25ML IM: CPT

## 2022-02-01 RX ORDER — ESCITALOPRAM OXALATE 20 MG/1
20 TABLET ORAL DAILY
Qty: 90 TABLET | Refills: 0 | Status: SHIPPED | OUTPATIENT
Start: 2022-02-01

## 2022-03-11 NOTE — TELEPHONE ENCOUNTER
Received refill request from pharmacy for patient.  Pharmacy requesting refill on Atorvastatin 10mg, please advise

## 2022-03-13 RX ORDER — ATORVASTATIN CALCIUM 10 MG/1
10 TABLET, FILM COATED ORAL NIGHTLY
Qty: 90 TABLET | Refills: 2 | Status: SHIPPED | OUTPATIENT
Start: 2022-03-13

## 2022-04-15 ENCOUNTER — TELEPHONE (OUTPATIENT)
Dept: FAMILY MEDICINE CLINIC | Facility: CLINIC | Age: 52
End: 2022-04-15

## 2022-04-15 NOTE — TELEPHONE ENCOUNTER
Pt scheduled for nurse visit on 4/20/22 for Shingrix #2. Shingrix #1: 11/20/21    Future Appointments   Date Time Provider Nancy Trujillo   4/20/2022  9:00 AM [facility] NURSE [facility] [address] [facility] [address]     Order pended.

## 2022-04-20 ENCOUNTER — NURSE ONLY (OUTPATIENT)
Dept: FAMILY MEDICINE CLINIC | Facility: CLINIC | Age: 52
End: 2022-04-20
Payer: COMMERCIAL

## 2022-04-20 DIAGNOSIS — Z23 NEED FOR SHINGLES VACCINE: ICD-10-CM

## 2022-04-20 PROCEDURE — 90750 HZV VACC RECOMBINANT IM: CPT | Performed by: FAMILY MEDICINE

## 2022-04-20 PROCEDURE — 90471 IMMUNIZATION ADMIN: CPT | Performed by: FAMILY MEDICINE

## 2022-07-14 DIAGNOSIS — F32.A CHRONIC DEPRESSION: ICD-10-CM

## 2022-07-14 RX ORDER — ESCITALOPRAM OXALATE 20 MG/1
20 TABLET ORAL DAILY
Qty: 90 TABLET | Refills: 0 | Status: SHIPPED | OUTPATIENT
Start: 2022-07-14

## 2022-07-25 ENCOUNTER — PATIENT MESSAGE (OUTPATIENT)
Dept: FAMILY MEDICINE CLINIC | Facility: CLINIC | Age: 52
End: 2022-07-25

## 2022-08-08 PROBLEM — K57.30 DIVERTICULOSIS OF COLON: Status: ACTIVE | Noted: 2022-08-08

## 2022-08-08 PROBLEM — Z12.11 SPECIAL SCREENING FOR MALIGNANT NEOPLASMS, COLON: Status: ACTIVE | Noted: 2022-08-08

## 2022-08-08 PROBLEM — D12.2 BENIGN NEOPLASM OF ASCENDING COLON: Status: ACTIVE | Noted: 2022-08-08

## 2022-08-08 PROBLEM — D12.4 BENIGN NEOPLASM OF DESCENDING COLON: Status: ACTIVE | Noted: 2022-08-08

## 2022-11-03 DIAGNOSIS — E78.00 PURE HYPERCHOLESTEROLEMIA: ICD-10-CM

## 2022-11-04 RX ORDER — ATORVASTATIN CALCIUM 10 MG/1
10 TABLET, FILM COATED ORAL NIGHTLY
Qty: 90 TABLET | Refills: 0 | Status: SHIPPED | OUTPATIENT
Start: 2022-11-04

## 2022-11-30 ENCOUNTER — OFFICE VISIT (OUTPATIENT)
Dept: FAMILY MEDICINE CLINIC | Facility: CLINIC | Age: 52
End: 2022-11-30
Payer: COMMERCIAL

## 2022-11-30 VITALS
BODY MASS INDEX: 40.92 KG/M2 | RESPIRATION RATE: 16 BRPM | SYSTOLIC BLOOD PRESSURE: 128 MMHG | HEIGHT: 72 IN | OXYGEN SATURATION: 99 % | TEMPERATURE: 97 F | HEART RATE: 78 BPM | DIASTOLIC BLOOD PRESSURE: 72 MMHG | WEIGHT: 302.13 LBS

## 2022-11-30 DIAGNOSIS — Z23 NEED FOR VACCINATION: ICD-10-CM

## 2022-11-30 DIAGNOSIS — E66.01 CLASS 3 SEVERE OBESITY DUE TO EXCESS CALORIES WITHOUT SERIOUS COMORBIDITY WITH BODY MASS INDEX (BMI) OF 40.0 TO 44.9 IN ADULT (HCC): ICD-10-CM

## 2022-11-30 DIAGNOSIS — K64.4 EXTERNAL HEMORRHOIDS: ICD-10-CM

## 2022-11-30 DIAGNOSIS — Z00.00 ROUTINE ADULT HEALTH MAINTENANCE: Primary | ICD-10-CM

## 2022-11-30 DIAGNOSIS — F32.A CHRONIC DEPRESSION: ICD-10-CM

## 2022-11-30 DIAGNOSIS — E55.9 VITAMIN D DEFICIENCY: ICD-10-CM

## 2022-11-30 PROBLEM — E66.813 CLASS 3 SEVERE OBESITY DUE TO EXCESS CALORIES WITHOUT SERIOUS COMORBIDITY WITH BODY MASS INDEX (BMI) OF 40.0 TO 44.9 IN ADULT: Status: ACTIVE | Noted: 2022-11-30

## 2022-11-30 PROBLEM — E66.813 CLASS 3 SEVERE OBESITY DUE TO EXCESS CALORIES WITHOUT SERIOUS COMORBIDITY WITH BODY MASS INDEX (BMI) OF 40.0 TO 44.9 IN ADULT (HCC): Status: ACTIVE | Noted: 2022-11-30

## 2022-11-30 PROCEDURE — 90686 IIV4 VACC NO PRSV 0.5 ML IM: CPT | Performed by: FAMILY MEDICINE

## 2022-11-30 PROCEDURE — 3078F DIAST BP <80 MM HG: CPT | Performed by: FAMILY MEDICINE

## 2022-11-30 PROCEDURE — 90471 IMMUNIZATION ADMIN: CPT | Performed by: FAMILY MEDICINE

## 2022-11-30 PROCEDURE — 90472 IMMUNIZATION ADMIN EACH ADD: CPT | Performed by: FAMILY MEDICINE

## 2022-11-30 PROCEDURE — 90715 TDAP VACCINE 7 YRS/> IM: CPT | Performed by: FAMILY MEDICINE

## 2022-11-30 PROCEDURE — 3008F BODY MASS INDEX DOCD: CPT | Performed by: FAMILY MEDICINE

## 2022-11-30 PROCEDURE — 99396 PREV VISIT EST AGE 40-64: CPT | Performed by: FAMILY MEDICINE

## 2022-11-30 PROCEDURE — 3074F SYST BP LT 130 MM HG: CPT | Performed by: FAMILY MEDICINE

## 2022-11-30 PROCEDURE — 99213 OFFICE O/P EST LOW 20 MIN: CPT | Performed by: FAMILY MEDICINE

## 2022-11-30 RX ORDER — ESCITALOPRAM OXALATE 20 MG/1
20 TABLET ORAL DAILY
Qty: 90 TABLET | Refills: 1 | Status: SHIPPED | OUTPATIENT
Start: 2022-11-30

## 2022-12-05 LAB
ABSOLUTE BASOPHILS: 33 CELLS/UL (ref 0–200)
ABSOLUTE EOSINOPHILS: 528 CELLS/UL (ref 15–500)
ABSOLUTE LYMPHOCYTES: 2020 CELLS/UL (ref 850–3900)
ABSOLUTE MONOCYTES: 449 CELLS/UL (ref 200–950)
ABSOLUTE NEUTROPHILS: 3571 CELLS/UL (ref 1500–7800)
ALBUMIN/GLOBULIN RATIO: 1.5 (CALC) (ref 1–2.5)
ALBUMIN: 4.2 G/DL (ref 3.6–5.1)
ALKALINE PHOSPHATASE: 53 U/L (ref 35–144)
ALT: 23 U/L (ref 9–46)
AST: 21 U/L (ref 10–35)
BASOPHILS: 0.5 %
BILIRUBIN, TOTAL: 0.5 MG/DL (ref 0.2–1.2)
BUN/CREATININE RATIO: 20 (CALC) (ref 6–22)
BUN: 14 MG/DL (ref 7–25)
CALCIUM: 9.2 MG/DL (ref 8.6–10.3)
CARBON DIOXIDE: 26 MMOL/L (ref 20–32)
CHLORIDE: 105 MMOL/L (ref 98–110)
CHOL/HDLC RATIO: 2.7 (CALC)
CHOLESTEROL, TOTAL: 156 MG/DL
CREATININE: 0.69 MG/DL (ref 0.7–1.3)
EGFR: 112 ML/MIN/1.73M2
EOSINOPHILS: 8 %
GLOBULIN: 2.8 G/DL (CALC) (ref 1.9–3.7)
GLUCOSE: 90 MG/DL (ref 65–99)
HDL CHOLESTEROL: 57 MG/DL
HEMATOCRIT: 39 % (ref 38.5–50)
HEMOGLOBIN A1C: 5.5 % OF TOTAL HGB
HEMOGLOBIN: 12.8 G/DL (ref 13.2–17.1)
LDL-CHOLESTEROL: 79 MG/DL (CALC)
LYMPHOCYTES: 30.6 %
MCH: 27.5 PG (ref 27–33)
MCHC: 32.8 G/DL (ref 32–36)
MCV: 83.9 FL (ref 80–100)
MONOCYTES: 6.8 %
MPV: 11 FL (ref 7.5–12.5)
NEUTROPHILS: 54.1 %
NON-HDL CHOLESTEROL: 99 MG/DL (CALC)
PLATELET COUNT: 235 THOUSAND/UL (ref 140–400)
POTASSIUM: 4.1 MMOL/L (ref 3.5–5.3)
PROTEIN, TOTAL: 7 G/DL (ref 6.1–8.1)
RDW: 13.1 % (ref 11–15)
RED BLOOD CELL COUNT: 4.65 MILLION/UL (ref 4.2–5.8)
SODIUM: 137 MMOL/L (ref 135–146)
T4, FREE: 1 NG/DL (ref 0.8–1.8)
TOTAL PSA: 0.7 NG/ML
TRIGLYCERIDES: 113 MG/DL
TSH: 1.41 MIU/L (ref 0.4–4.5)
VITAMIN D, 25-OH, TOTAL: 52 NG/ML (ref 30–100)
WHITE BLOOD CELL COUNT: 6.6 THOUSAND/UL (ref 3.8–10.8)

## 2022-12-08 DIAGNOSIS — E78.00 PURE HYPERCHOLESTEROLEMIA: ICD-10-CM

## 2022-12-10 ENCOUNTER — PATIENT MESSAGE (OUTPATIENT)
Dept: FAMILY MEDICINE CLINIC | Facility: CLINIC | Age: 52
End: 2022-12-10

## 2022-12-10 DIAGNOSIS — D64.9 LOW HEMOGLOBIN: Primary | ICD-10-CM

## 2022-12-10 RX ORDER — ATORVASTATIN CALCIUM 10 MG/1
10 TABLET, FILM COATED ORAL NIGHTLY
Qty: 90 TABLET | Refills: 1 | Status: SHIPPED | OUTPATIENT
Start: 2022-12-10

## 2022-12-12 NOTE — TELEPHONE ENCOUNTER
From: Carlos Barakat  To: Amber Brannon MD  Sent: 12/10/2022 10:08 AM CST  Subject: Low hemoglobin result    Thank you for the message regarding my results. With regards to the low hemoglobin should I take an iron supplement? I take a multivitamin along with additional vitamin D currently.

## 2022-12-29 ENCOUNTER — IMMUNIZATION (OUTPATIENT)
Dept: LAB | Age: 52
End: 2022-12-29
Attending: EMERGENCY MEDICINE
Payer: COMMERCIAL

## 2022-12-29 DIAGNOSIS — Z23 NEED FOR VACCINATION: Primary | ICD-10-CM

## 2022-12-29 PROCEDURE — 0134A SARSCOV2 VAC BVL 50MCG/0.5ML: CPT

## 2023-03-30 LAB
ABSOLUTE BASOPHILS: 34 CELLS/UL (ref 0–200)
ABSOLUTE EOSINOPHILS: 370 CELLS/UL (ref 15–500)
ABSOLUTE LYMPHOCYTES: 2226 CELLS/UL (ref 850–3900)
ABSOLUTE MONOCYTES: 521 CELLS/UL (ref 200–950)
ABSOLUTE NEUTROPHILS: 5250 CELLS/UL (ref 1500–7800)
BASOPHILS: 0.4 %
EOSINOPHILS: 4.4 %
HEMATOCRIT: 40.3 % (ref 38.5–50)
HEMOGLOBIN: 13.6 G/DL (ref 13.2–17.1)
LYMPHOCYTES: 26.5 %
MCH: 28.1 PG (ref 27–33)
MCHC: 33.7 G/DL (ref 32–36)
MCV: 83.3 FL (ref 80–100)
MONOCYTES: 6.2 %
MPV: 10.7 FL (ref 7.5–12.5)
NEUTROPHILS: 62.5 %
PLATELET COUNT: 267 THOUSAND/UL (ref 140–400)
RDW: 12.7 % (ref 11–15)
RED BLOOD CELL COUNT: 4.84 MILLION/UL (ref 4.2–5.8)
WHITE BLOOD CELL COUNT: 8.4 THOUSAND/UL (ref 3.8–10.8)

## 2023-05-13 DIAGNOSIS — F32.A CHRONIC DEPRESSION: ICD-10-CM

## 2023-05-16 RX ORDER — ESCITALOPRAM OXALATE 20 MG/1
20 TABLET ORAL DAILY
Qty: 90 TABLET | Refills: 1 | Status: SHIPPED | OUTPATIENT
Start: 2023-05-16

## 2023-06-17 DIAGNOSIS — E78.00 PURE HYPERCHOLESTEROLEMIA: ICD-10-CM

## 2023-06-17 RX ORDER — ATORVASTATIN CALCIUM 10 MG/1
TABLET, FILM COATED ORAL
Qty: 90 TABLET | Refills: 1 | Status: SHIPPED | OUTPATIENT
Start: 2023-06-17

## 2023-06-17 NOTE — TELEPHONE ENCOUNTER
Name from pharmacy: ATORVASTATIN 10 MG TABLET         Will file in chart as: ATORVASTATIN 10 MG Oral Tab    Sig: TAKE 1 TABLET BY MOUTH EVERY EVENING    Disp: 90 tablet    Refills: 1 (Pharmacy requested: Not specified)    Start: 6/17/2023    Class: Normal    Non-formulary For: Pure hypercholesterolemia    Last ordered: 6 months ago by Amber Brannon MD Last refill: 3/16/2023    Rx #: [id number]    Cholesterol Medication Protocol [id number]/17/2023 07:25 AM   Protocol Details ALT < 80    ALT resulted within past year    Lipid panel within past 12 months    Appointment within past 12 or next 3 months      To be filled at: [address], [address] Duard Aschoff [phone], [phone]

## 2023-10-27 DIAGNOSIS — E78.00 PURE HYPERCHOLESTEROLEMIA: ICD-10-CM

## 2023-10-30 RX ORDER — ATORVASTATIN CALCIUM 10 MG/1
10 TABLET, FILM COATED ORAL EVERY EVENING
Qty: 90 TABLET | Refills: 1 | Status: SHIPPED | OUTPATIENT
Start: 2023-10-30

## 2023-11-21 DIAGNOSIS — F32.A CHRONIC DEPRESSION: ICD-10-CM

## 2023-11-27 NOTE — TELEPHONE ENCOUNTER
Requesting Escitalopram 20mg  LOV: 11/30/22 Physical  RTC: 1 year  Last Relevant Labs: 12/3/22  Filled: 5/16/23 #90 with 1 refills    No future appointments.     Please schedule pt for appt, due for annual physical

## 2023-12-06 RX ORDER — ESCITALOPRAM OXALATE 20 MG/1
20 TABLET ORAL DAILY
Qty: 90 TABLET | Refills: 1 | OUTPATIENT
Start: 2023-12-06

## 2024-02-08 DIAGNOSIS — E78.00 PURE HYPERCHOLESTEROLEMIA: ICD-10-CM

## 2024-02-08 DIAGNOSIS — E55.9 VITAMIN D DEFICIENCY: Primary | ICD-10-CM

## 2024-02-16 DIAGNOSIS — E78.00 PURE HYPERCHOLESTEROLEMIA: ICD-10-CM

## 2024-02-16 RX ORDER — ATORVASTATIN CALCIUM 10 MG/1
10 TABLET, FILM COATED ORAL EVERY EVENING
Qty: 90 TABLET | Refills: 0 | Status: SHIPPED | OUTPATIENT
Start: 2024-02-16

## 2024-02-16 RX ORDER — ATORVASTATIN CALCIUM 10 MG/1
10 TABLET, FILM COATED ORAL EVERY EVENING
Qty: 30 TABLET | Refills: 0 | Status: SHIPPED | OUTPATIENT
Start: 2024-02-16 | End: 2024-02-16

## 2024-02-25 LAB
ABSOLUTE BASOPHILS: 31 CELLS/UL (ref 0–200)
ABSOLUTE EOSINOPHILS: 291 CELLS/UL (ref 15–500)
ABSOLUTE LYMPHOCYTES: 2046 CELLS/UL (ref 850–3900)
ABSOLUTE MONOCYTES: 372 CELLS/UL (ref 200–950)
ABSOLUTE NEUTROPHILS: 3460 CELLS/UL (ref 1500–7800)
ALBUMIN/GLOBULIN RATIO: 1.4 (CALC) (ref 1–2.5)
ALBUMIN: 4.1 G/DL (ref 3.6–5.1)
ALKALINE PHOSPHATASE: 51 U/L (ref 35–144)
ALT: 19 U/L (ref 9–46)
AST: 17 U/L (ref 10–35)
BASOPHILS: 0.5 %
BILIRUBIN, TOTAL: 0.5 MG/DL (ref 0.2–1.2)
BUN: 16 MG/DL (ref 7–25)
CALCIUM: 9.2 MG/DL (ref 8.6–10.3)
CARBON DIOXIDE: 22 MMOL/L (ref 20–32)
CHLORIDE: 107 MMOL/L (ref 98–110)
CHOL/HDLC RATIO: 3.9 (CALC)
CHOLESTEROL, TOTAL: 215 MG/DL
CREATININE: 0.72 MG/DL (ref 0.7–1.3)
EGFR: 110 ML/MIN/1.73M2
EOSINOPHILS: 4.7 %
GLOBULIN: 2.9 G/DL (CALC) (ref 1.9–3.7)
GLUCOSE: 86 MG/DL (ref 65–99)
HDL CHOLESTEROL: 55 MG/DL
HEMATOCRIT: 39.3 % (ref 38.5–50)
HEMOGLOBIN: 12.7 G/DL (ref 13.2–17.1)
LDL-CHOLESTEROL: 137 MG/DL (CALC)
LYMPHOCYTES: 33 %
MCH: 27.6 PG (ref 27–33)
MCHC: 32.3 G/DL (ref 32–36)
MCV: 85.4 FL (ref 80–100)
MONOCYTES: 6 %
MPV: 10.8 FL (ref 7.5–12.5)
NEUTROPHILS: 55.8 %
NON-HDL CHOLESTEROL: 160 MG/DL (CALC)
PLATELET COUNT: 255 THOUSAND/UL (ref 140–400)
POTASSIUM: 4.2 MMOL/L (ref 3.5–5.3)
PROTEIN, TOTAL: 7 G/DL (ref 6.1–8.1)
RDW: 13 % (ref 11–15)
RED BLOOD CELL COUNT: 4.6 MILLION/UL (ref 4.2–5.8)
SODIUM: 138 MMOL/L (ref 135–146)
TRIGLYCERIDES: 116 MG/DL
VITAMIN D, 25-OH, TOTAL: 57 NG/ML (ref 30–100)
WHITE BLOOD CELL COUNT: 6.2 THOUSAND/UL (ref 3.8–10.8)

## 2024-02-28 ENCOUNTER — OFFICE VISIT (OUTPATIENT)
Dept: FAMILY MEDICINE CLINIC | Facility: CLINIC | Age: 54
End: 2024-02-28
Payer: COMMERCIAL

## 2024-02-28 VITALS
SYSTOLIC BLOOD PRESSURE: 122 MMHG | DIASTOLIC BLOOD PRESSURE: 78 MMHG | WEIGHT: 308 LBS | BODY MASS INDEX: 41.72 KG/M2 | OXYGEN SATURATION: 98 % | TEMPERATURE: 98 F | HEIGHT: 72 IN | HEART RATE: 74 BPM | RESPIRATION RATE: 16 BRPM

## 2024-02-28 DIAGNOSIS — Z00.00 ROUTINE ADULT HEALTH MAINTENANCE: Primary | ICD-10-CM

## 2024-02-28 DIAGNOSIS — E78.00 PURE HYPERCHOLESTEROLEMIA: ICD-10-CM

## 2024-02-28 DIAGNOSIS — Z12.5 PROSTATE CANCER SCREENING: ICD-10-CM

## 2024-02-28 DIAGNOSIS — E66.01 CLASS 3 SEVERE OBESITY DUE TO EXCESS CALORIES WITHOUT SERIOUS COMORBIDITY WITH BODY MASS INDEX (BMI) OF 40.0 TO 44.9 IN ADULT (HCC): ICD-10-CM

## 2024-02-28 PROCEDURE — 99396 PREV VISIT EST AGE 40-64: CPT | Performed by: FAMILY MEDICINE

## 2024-02-28 PROCEDURE — 99213 OFFICE O/P EST LOW 20 MIN: CPT | Performed by: FAMILY MEDICINE

## 2024-02-28 RX ORDER — TIRZEPATIDE 2.5 MG/.5ML
1 INJECTION, SOLUTION SUBCUTANEOUS WEEKLY
Qty: 2 ML | Refills: 0 | Status: SHIPPED | OUTPATIENT
Start: 2024-02-28

## 2024-02-28 NOTE — PROGRESS NOTES
Julio Barakat is a 54 year old male who presents for a complete physical exam.   HPI:   Pt complains of nothing.    Hurt left knee 12/23.     Dull ache sides of both knees, right side gone, left side hurts more.       Urination changes no  ED symptoms no  Immunizations needed no  Wt Readings from Last 6 Encounters:   02/28/24 (!) 308 lb (139.7 kg)   11/30/22 (!) 302 lb 2 oz (137 kg)   08/02/22 259 lb (117.5 kg)   11/20/21 287 lb 6 oz (130.4 kg)   04/05/21 300 lb (136.1 kg)   11/20/20 298 lb (135.2 kg)     Body mass index is 41.77 kg/m².     Results for orders placed or performed in visit on 02/08/24   LIPID PANEL [4840] [Q]   Result Value Ref Range    CHOLESTEROL, TOTAL 215 (H) <200 mg/dL    HDL CHOLESTEROL 55 > OR = 40 mg/dL    TRIGLYCERIDES 116 <150 mg/dL    LDL-CHOLESTEROL 137 (H) mg/dL (calc)    CHOL/HDLC RATIO 3.9 <5.0 (calc)    NON-HDL CHOLESTEROL 160 (H) <130 mg/dL (calc)   COMP METABOLIC PANEL [47749] [Q]   Result Value Ref Range    GLUCOSE 86 65 - 99 mg/dL    UREA NITROGEN (BUN) 16 7 - 25 mg/dL    CREATININE 0.72 0.70 - 1.30 mg/dL    EGFR 110 > OR = 60 mL/min/1.73m2    BUN/CREATININE RATIO SEE NOTE: 6 - 22 (calc)    SODIUM 138 135 - 146 mmol/L    POTASSIUM 4.2 3.5 - 5.3 mmol/L    CHLORIDE 107 98 - 110 mmol/L    CARBON DIOXIDE 22 20 - 32 mmol/L    CALCIUM 9.2 8.6 - 10.3 mg/dL    PROTEIN, TOTAL 7.0 6.1 - 8.1 g/dL    ALBUMIN 4.1 3.6 - 5.1 g/dL    GLOBULIN 2.9 1.9 - 3.7 g/dL (calc)    ALBUMIN/GLOBULIN RATIO 1.4 1.0 - 2.5 (calc)    BILIRUBIN, TOTAL 0.5 0.2 - 1.2 mg/dL    ALKALINE PHOSPHATASE 51 35 - 144 U/L    AST 17 10 - 35 U/L    ALT 19 9 - 46 U/L   CBC [6399] [Q]   Result Value Ref Range    WHITE BLOOD CELL COUNT 6.2 3.8 - 10.8 Thousand/uL    RED BLOOD CELL COUNT 4.60 4.20 - 5.80 Million/uL    HEMOGLOBIN 12.7 (L) 13.2 - 17.1 g/dL    HEMATOCRIT 39.3 38.5 - 50.0 %    MCV 85.4 80.0 - 100.0 fL    MCH 27.6 27.0 - 33.0 pg    MCHC 32.3 32.0 - 36.0 g/dL    RDW 13.0 11.0 - 15.0 %    PLATELET COUNT 255 140 - 400  Thousand/uL    MPV 10.8 7.5 - 12.5 fL    ABSOLUTE NEUTROPHILS 3,460 1,500 - 7,800 cells/uL    ABSOLUTE LYMPHOCYTES 2,046 850 - 3,900 cells/uL    ABSOLUTE MONOCYTES 372 200 - 950 cells/uL    ABSOLUTE EOSINOPHILS 291 15 - 500 cells/uL    ABSOLUTE BASOPHILS 31 0 - 200 cells/uL    NEUTROPHILS 55.8 %    LYMPHOCYTES 33.0 %    MONOCYTES 6.0 %    EOSINOPHILS 4.7 %    BASOPHILS 0.5 %   VITAMIN D, 25-HYDROXY [96633][Q]   Result Value Ref Range    VITAMIN D, 25-OH, TOTAL 57 30 - 100 ng/mL       Current Outpatient Medications   Medication Sig Dispense Refill    Tirzepatide-Weight Management (ZEPBOUND) 2.5 MG/0.5ML Subcutaneous Solution Auto-injector Inject 1 Application into the skin once a week. 2 mL 0    ATORVASTATIN 10 MG Oral Tab TAKE 1 TABLET(10 MG) BY MOUTH EVERY EVENING 90 tablet 0    escitalopram 20 MG Oral Tab Take 1 tablet (20 mg total) by mouth daily. 90 tablet 1    Ergocalciferol (VITAMIN D OR) Take 1 capsule by mouth daily.        Past Medical History:   Diagnosis Date    Bloating     Depression     Diarrhea, unspecified     Flatulence/gas pain/belching     GERD (gastroesophageal reflux disease)     Heart palpitations 06/01/2022    Heartburn 1/1/2022    Hemorrhoids     High cholesterol     TORRI (obstructive sleep apnea) 10/2017    in process of getting cpap machine 3/18    Other and unspecified hyperlipidemia     RLS (restless legs syndrome) 10/2017    Stress     Wears glasses       Past Surgical History:   Procedure Laterality Date    ANGIOGRAM      VASECTOMY  07/10/14    Dr. Lacy       Family History   Problem Relation Age of Onset    Heart Disorder Maternal Grandmother     Cancer Maternal Grandmother         liver      Social History:  Social History     Socioeconomic History    Marital status:    Tobacco Use    Smoking status: Never    Smokeless tobacco: Never   Vaping Use    Vaping Use: Never used   Substance and Sexual Activity    Alcohol use: Not Currently     Alcohol/week: 0.0 standard drinks of  alcohol     Comment: social    Drug use: No    Sexual activity: Yes     Partners: Female     Birth control/protection: Vasectomy   Other Topics Concern    Caffeine Concern No     Comment: occasionally    Sleep Concern No     Comment: 6 hours, mostly well rested.    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 Denies SAMPSON/lower extremity swelling  GI: denies abdominal pain,denies heartburn, denies n/v/c/d/change in stools/blood in stool/black stool/change in appetite  : denies nocturia or changes in urinary stream, denies scrotal mass/abnormal discharge from urethra  MUSCULOSKELETAL: denies joint or muscle aches or pains  NEURO: denies headaches/dizziness  PSYCH: denies depression or anxiety  HEMATOLOGIC: denies easy bruising/bleeding/anemia/blood clot disorders  ENDOCRINE: denies weight gain/weight loss/enlargement of neck glands/polyuria/polydypsia  ALL/ASTHMA: denies allergic rhinitis/asthma    EXAM:   /78   Pulse 74   Temp 98 °F (36.7 °C) (Temporal)   Resp 16   Ht 6' (1.829 m)   Wt (!) 308 lb (139.7 kg)   SpO2 98%   BMI 41.77 kg/m²   Body mass index is 41.77 kg/m².   GENERAL: NAD, pleasant, well developed, normal voice  SKIN: no rashes, no suspicious moles or lesions  HENT: NCAT, EACs clear b/l, TMs normal b/l, nasal turbinates normal b/l, oropharynx with mmm/clear/normal, gingiva normal, lips normal  EYES: PERRLA, EOMI, conjunctiva non-injected  and non-icteric  NECK: supple, no adenopathy/thyromegaly/thyroid nodules/masses  CHEST: no chest tenderness  LUNGS: CTA A/P, no wheezes/ronchi/rales/crackles, normal air excursion  CARDIOVASCULAR: RRR, no murmur, no lower extremity edema, pedal and femoral pulses 2+ and symmetric b/l  GI: normoactive BS, non-distended, non-tender to palpation, no HSM/masses/pulsations  MUSCULOSKELETAL: Back with normal AROM, no joint swelling, extremities x 4 with normal strength 5/5 and symmetric and with normal AROM/PROM.   EXTREMITIES: no cyanosis, clubbing or edema  NEURO: A&O x3, CN II-XII grossly intact. Reflexes: biceps and patellar 2+ b/l and symmetric. Gait is normal.  PSYCH: normal affect, no apparent thought disorder, average judgement and insight    Immunization History   Administered Date(s) Administered    Covid-19 Vaccine Moderna 50 Mcg/0.25 Ml 12/18/2021    Covid-19 Vaccine Moderna Bivalent 50mcg/0.5mL 12+ years 12/29/2022    Covid-19 Vaccine Pfizer 30 mcg/0.3 ml 04/20/2021, 05/11/2021    FLULAVAL 6 months & older 0.5 ml Prefilled syringe (59483) 11/04/2019, 11/20/2021, 11/30/2022    FLUZONE 6 months and older PFS 0.5 ml (01623) 11/04/2019, 10/16/2020, 11/20/2021    Influenza 10/16/2020, 09/18/2023    Influenza(Afluria)0.5ml QIV PFS 10/16/2020    Moderna Covid-19 Vaccine 50mcg/0.5ml 12yrs+ ([id number]) 09/18/2023    TDAP 01/01/2010, 11/30/2022    Zoster Vaccine Recombinant Adjuvanted (Shingrix) 11/20/2021, 04/20/2022       ASSESSMENT AND PLAN:   Julio Barakat is a 54 year old male who presents for a complete physical exam.     Julio was seen today for physical and knee pain.    Diagnoses and all orders for this visit:    Routine adult health maintenance    Prostate cancer screening  -     PSA Total, Diagnostic    Pure hypercholesterolemia  -     Comp Metabolic Panel (14)  -     Lipid Panel    Class 3 severe obesity due to excess calories without serious comorbidity with body mass index (BMI) of 40.0 to 44.9 in adult  (HCC)    Other orders  -     Tirzepatide-Weight Management (ZEPBOUND) 2.5 MG/0.5ML Subcutaneous Solution Auto-injector; Inject 1 Application into the skin once a week.    He has no calf tenderness and no chest pain or shortness of breath.  I do not think this is related to blood clot.  Likely related to muscular strain or tendinous strain.  I would recommend trying to wear compression socks and see if this helps and use anti-inflammatory and ice as needed.  Stretching exercises as well.  He should have a PSA blood test done as well as a repeat CMP and lipid panel due to high cholesterol in about 3 to 4 months.    We did discuss weight reduction at great length and patient interested in medication.  Will try a stepdown 2.5 mg once a week subcutaneous injection.  Risk and benefit discussed in detail.  Patient agrees to proceed.  If he does obtain medication he will let me know in about 3 weeks how he is doing.        Pt educated on immunizations and health maintenance appropriate for age.     The patient verbalizes understanding of these recommendations and agrees to the plan.    The patient is asked to return for complete physical yearly.    There are no Patient Instructions on file for this visit.      Procedures    PSA Total, Diagnostic

## 2024-02-29 ENCOUNTER — TELEPHONE (OUTPATIENT)
Dept: FAMILY MEDICINE CLINIC | Facility: CLINIC | Age: 54
End: 2024-02-29

## 2024-02-29 NOTE — TELEPHONE ENCOUNTER
Received request from [employer]id[employer]/Cathy that a Prior Authorization is needed for  Zepbound . Key#[id number].   Paperwork placed in MA folder.

## 2024-03-03 ENCOUNTER — PATIENT MESSAGE (OUTPATIENT)
Dept: FAMILY MEDICINE CLINIC | Facility: CLINIC | Age: 54
End: 2024-03-03

## 2024-03-04 NOTE — TELEPHONE ENCOUNTER
From: Julio Barakat  To: Kameron Peres  Sent: 3/3/2024 8:39 PM CST  Subject: Zepbound coverage    Hi Dr. Peres,    I had a notice that [employer] had an issue with the Zepbound prescription being covered by my insurance. I called [facility] and asked them if there was anything to do to see if coverage would apply. They mentioned I would need prior authorization by my primary care physician. They gave me a number for you to call [phone] with authorization.  Please let me know if you need any other information.  Thank you, Julio Barakat

## 2024-03-13 DIAGNOSIS — F32.A CHRONIC DEPRESSION: ICD-10-CM

## 2024-03-19 RX ORDER — ESCITALOPRAM OXALATE 20 MG/1
20 TABLET ORAL DAILY
Qty: 90 TABLET | Refills: 0 | Status: SHIPPED | OUTPATIENT
Start: 2024-03-19

## 2024-03-19 NOTE — TELEPHONE ENCOUNTER
Requesting     Disp Refills Start End     escitalopram 20 MG Oral Tab 90 tablet 1 5/16/2023 --    Sig - Route: Take 1 tablet (20 mg total) by mouth daily. - Oral    Sent to pharmacy as: Escitalopram Oxalate 20 MG Oral Tablet (Lexapro)    E-Prescribing Status: Receipt confirmed by pharmacy (5/16/2023 11:56 AM CDT)      LOV: 2/28/2024    RTC: The patient is asked to return for complete physical yearly.     Filled: not on the dispense report    No future appointments.    Psychiatric Non-Scheduled (Anti-Anxiety) Oraylj4603/13/2024 08:27 PM   Protocol Details In person appointment or virtual visit in the past 6 mos or appointment in next 3 mos    Depression Screening completed within the past 12 months     Rx sent

## 2024-03-22 ENCOUNTER — TELEPHONE (OUTPATIENT)
Dept: FAMILY MEDICINE CLINIC | Facility: CLINIC | Age: 54
End: 2024-03-22

## 2024-03-22 NOTE — TELEPHONE ENCOUNTER
- Braeden'd incoming fax for prior authorzation for Ozempic (0.25 or 0.5MG Dose  2ML/3ML Pen- injectors.    Key:[id number]    Placed fax in MA folder

## 2024-06-12 DIAGNOSIS — E78.00 PURE HYPERCHOLESTEROLEMIA: ICD-10-CM

## 2024-06-14 RX ORDER — ATORVASTATIN CALCIUM 10 MG/1
10 TABLET, FILM COATED ORAL EVERY EVENING
Qty: 90 TABLET | Refills: 0 | Status: SHIPPED | OUTPATIENT
Start: 2024-06-14

## 2024-06-14 NOTE — TELEPHONE ENCOUNTER
Name from pharmacy: ATORVASTATIN 10MG TABLETS          Will file in chart as: ATORVASTATIN 10 MG Oral Tab    Sig: TAKE 1 TABLET(10 MG) BY MOUTH EVERY EVENING    Disp: 90 tablet    Refills: 0 (Pharmacy requested: Not specified)    Start: 6/12/2024    Class: Normal    Non-formulary For: Pure hypercholesterolemia    Last ordered: 3 months ago (2/16/2024) by Kameron Peres MD    Last refill: 2/16/2024    Rx #: [id number]    Cholesterol Medication Protocol Ltjhjp6006/12/2024 08:43 PM   Protocol Details ALT < 80    ALT resulted within past year    Lipid panel within past 12 months    In person appointment or virtual visit in the past 12 mos or appointment in next 3 mos        LOV: 2/28/2024  RTC: not on file  Last Relevant Labs: 2/24/2024  Filled: 2/16/2024 #90 with 0 refills    Refill request approved per protocol.      No future appointments.

## 2024-06-18 DIAGNOSIS — F32.A CHRONIC DEPRESSION: ICD-10-CM

## 2024-06-19 RX ORDER — ESCITALOPRAM OXALATE 20 MG/1
20 TABLET ORAL DAILY
Qty: 90 TABLET | Refills: 0 | Status: SHIPPED | OUTPATIENT
Start: 2024-06-19

## 2024-06-19 NOTE — TELEPHONE ENCOUNTER
Name from pharmacy: ESCITALOPRAM 20MG TABLETS         Will file in chart as: ESCITALOPRAM 20 MG Oral Tab    Sig: TAKE 1 TABLET(20 MG) BY MOUTH DAILY    Disp: 90 tablet    Refills: 0 (Pharmacy requested: Not specified)    Start: 6/18/2024    Class: Normal    Non-formulary For: Chronic depression    Last ordered: 3 months ago (3/19/2024) by Kameron Peres MD    Last refill: 3/19/2024    Rx #: [id number]    Psychiatric Non-Scheduled (Anti-Anxiety) [id number]/18/2024 07:17 PM   Protocol Details In person appointment or virtual visit in the past 6 mos or appointment in next 3 mos    Depression Screening completed within the past 12 months      [id number] [address] AT [facility], [phone], [phone]

## 2024-09-15 DIAGNOSIS — E78.00 PURE HYPERCHOLESTEROLEMIA: ICD-10-CM

## 2024-09-17 RX ORDER — ATORVASTATIN CALCIUM 10 MG/1
10 TABLET, FILM COATED ORAL EVERY EVENING
Qty: 90 TABLET | Refills: 0 | Status: SHIPPED | OUTPATIENT
Start: 2024-09-17

## 2024-11-01 DIAGNOSIS — F32.A CHRONIC DEPRESSION: ICD-10-CM

## 2024-11-04 DIAGNOSIS — F32.A CHRONIC DEPRESSION: ICD-10-CM

## 2024-11-04 RX ORDER — ESCITALOPRAM OXALATE 20 MG/1
20 TABLET ORAL DAILY
Qty: 90 TABLET | Refills: 0 | Status: SHIPPED | OUTPATIENT
Start: 2024-11-04

## 2024-11-04 NOTE — TELEPHONE ENCOUNTER
Requesting Escitalopram 20mg  LOV: 2/28/24 Physical  RTC: 1 year  Last Relevant Labs: 2/24/24  Filled: 6/19/24 #90 with 0 refills    No future appointments.    Psychiatric Non-Scheduled (Anti-Anxiety) Afasjs9211/01/2024 08:23 AM   Protocol Details   In person appointment or virtual visit in the past 6 mos or appointment in next 3 mos    Depression Screening completed within the past 12 months     Rx sent to pharmacy per protocol

## 2024-11-06 DIAGNOSIS — F32.A CHRONIC DEPRESSION: ICD-10-CM

## 2024-11-07 ENCOUNTER — PATIENT MESSAGE (OUTPATIENT)
Dept: FAMILY MEDICINE CLINIC | Facility: CLINIC | Age: 54
End: 2024-11-07

## 2024-11-07 RX ORDER — ESCITALOPRAM OXALATE 20 MG/1
20 TABLET ORAL DAILY
Qty: 90 TABLET | Refills: 0 | OUTPATIENT
Start: 2024-11-07

## 2024-11-07 RX ORDER — ESCITALOPRAM OXALATE 20 MG/1
20 TABLET ORAL DAILY
Qty: 90 TABLET | Refills: 0 | Status: SHIPPED | OUTPATIENT
Start: 2024-11-07

## 2024-12-16 DIAGNOSIS — E78.00 PURE HYPERCHOLESTEROLEMIA: ICD-10-CM

## 2024-12-17 RX ORDER — ATORVASTATIN CALCIUM 10 MG/1
10 TABLET, FILM COATED ORAL EVERY EVENING
Qty: 90 TABLET | Refills: 0 | Status: SHIPPED | OUTPATIENT
Start: 2024-12-17

## 2025-03-03 DIAGNOSIS — F32.A CHRONIC DEPRESSION: ICD-10-CM

## 2025-03-05 RX ORDER — ESCITALOPRAM OXALATE 20 MG/1
20 TABLET ORAL DAILY
Qty: 90 TABLET | Refills: 0 | Status: SHIPPED | OUTPATIENT
Start: 2025-03-05

## 2025-03-05 NOTE — TELEPHONE ENCOUNTER
Name from pharmacy: ESCITALOPRAM 20MG TABLETS         Will file in chart as: ESCITALOPRAM 20 MG Oral Tab    Sig: TAKE 1 TABLET(20 MG) BY MOUTH DAILY    Disp: 90 tablet    Refills: 0 (Pharmacy requested: Not specified)    Start: 3/3/2025    Class: Normal    Non-formulary For: Chronic depression    Last ordered: 3 months ago (11/7/2024) by Kameron Peres MD    Last refill: 11/7/2024    Rx #: [id number]    Psychiatric Non-Scheduled (Anti-Anxiety) Aguidg0203/03/2025 05:58 AM   Protocol Details In person appointment or virtual visit in the past 6 mos or appointment in next 3 mos    Depression Screening completed within the past 12 months    Medication is active on med list      To be filled at: [facility] #[id number] [facility] [address] AT [facility], [phone], [phone]     No future appointments.    LOV: cpx: 2/28/24  Last r/f: 11/7/24 # 90 0 r/fs  Labs: 2/24/24     RTC: yearly    Please advise

## 2025-03-15 DIAGNOSIS — E78.00 PURE HYPERCHOLESTEROLEMIA: ICD-10-CM

## 2025-03-17 ENCOUNTER — PATIENT MESSAGE (OUTPATIENT)
Dept: FAMILY MEDICINE CLINIC | Facility: CLINIC | Age: 55
End: 2025-03-17

## 2025-03-18 RX ORDER — ATORVASTATIN CALCIUM 10 MG/1
10 TABLET, FILM COATED ORAL EVERY EVENING
Qty: 90 TABLET | Refills: 0 | Status: SHIPPED | OUTPATIENT
Start: 2025-03-18

## 2025-04-15 NOTE — TELEPHONE ENCOUNTER
Future Appointments   Date Time Provider Department Center   4/30/2025  9:20 AM Kameron Peres MD [facility] [address] [facility] [address]

## 2025-04-30 ENCOUNTER — OFFICE VISIT (OUTPATIENT)
Dept: FAMILY MEDICINE CLINIC | Facility: CLINIC | Age: 55
End: 2025-04-30
Payer: COMMERCIAL

## 2025-04-30 VITALS
SYSTOLIC BLOOD PRESSURE: 124 MMHG | TEMPERATURE: 97 F | DIASTOLIC BLOOD PRESSURE: 92 MMHG | WEIGHT: 310 LBS | OXYGEN SATURATION: 97 % | HEIGHT: 72 IN | RESPIRATION RATE: 14 BRPM | BODY MASS INDEX: 41.99 KG/M2 | HEART RATE: 67 BPM

## 2025-04-30 DIAGNOSIS — Z13.6 ISCHEMIC HEART DISEASE SCREEN: ICD-10-CM

## 2025-04-30 DIAGNOSIS — E55.9 VITAMIN D DEFICIENCY: ICD-10-CM

## 2025-04-30 DIAGNOSIS — E78.00 PURE HYPERCHOLESTEROLEMIA: ICD-10-CM

## 2025-04-30 DIAGNOSIS — Z00.00 ROUTINE GENERAL MEDICAL EXAMINATION AT A HEALTH CARE FACILITY: Primary | ICD-10-CM

## 2025-04-30 DIAGNOSIS — R73.9 ELEVATED BLOOD SUGAR: ICD-10-CM

## 2025-04-30 DIAGNOSIS — F32.A CHRONIC DEPRESSION: ICD-10-CM

## 2025-04-30 DIAGNOSIS — E66.813 CLASS 3 SEVERE OBESITY DUE TO EXCESS CALORIES WITHOUT SERIOUS COMORBIDITY WITH BODY MASS INDEX (BMI) OF 40.0 TO 44.9 IN ADULT: ICD-10-CM

## 2025-04-30 PROCEDURE — 99213 OFFICE O/P EST LOW 20 MIN: CPT | Performed by: FAMILY MEDICINE

## 2025-04-30 PROCEDURE — 99396 PREV VISIT EST AGE 40-64: CPT | Performed by: FAMILY MEDICINE

## 2025-04-30 RX ORDER — ESCITALOPRAM OXALATE 20 MG/1
20 TABLET ORAL DAILY
Qty: 90 TABLET | Refills: 1 | Status: SHIPPED | OUTPATIENT
Start: 2025-04-30

## 2025-04-30 RX ORDER — SEMAGLUTIDE 0.68 MG/ML
0.25 INJECTION, SOLUTION SUBCUTANEOUS WEEKLY
Qty: 3 ML | Refills: 0 | Status: SHIPPED | OUTPATIENT
Start: 2025-04-30

## 2025-04-30 RX ORDER — ATORVASTATIN CALCIUM 10 MG/1
10 TABLET, FILM COATED ORAL EVERY EVENING
Qty: 90 TABLET | Refills: 1 | Status: SHIPPED | OUTPATIENT
Start: 2025-04-30

## 2025-05-02 NOTE — PROGRESS NOTES
The following individual(s) verbally consented to be recorded using ambient AI listening technology and understand that they can each withdraw their consent to this listening technology at any point by asking the clinician to turn off or pause the recording:    Patient name: Julio Barakat  Additional names:  NONE  Subjective:   Julio Barakat is a 55 year old male who presents for Physical (Labs ordered) and Immunization/Injection (Pneumonia-pt will do)       History/Other:   History of Present Illness  Julio Barakat is a 55 year old male who presents for a routine checkup and blood work.    He has been experiencing weight gain and is managing it through intermittent fasting and increased physical activity. Over the past two weeks, his weight has been decreasing with these efforts. He is interested in weight management medications, specifically mentioning Ozempic and Wegovy, though insurance coverage for these medications is unclear.    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 He has been trying to walk more to alleviate the discomfort. Additionally, he reports shoulder pain with certain movements and thigh pain that sometimes hampers walking. The thigh pain does not radiate to his back and is not associated with numbness or tingling. He suspects it might be related to a previous knee injury from two years ago.    He mentions a history of elevated blood sugar in 2019 and is currently taking atorvastatin and escitalopram. He has been taking two capsules of vitamin D daily due to previous low levels. He is concerned about his blood pressure, which was recently measured at 124/92. He also inquires about his prostate health, noting that his last PSA test was in December 2022.    He uses a CPAP machine for sleep apnea and mentions that he has not had a recent follow-up with a pulmonologist.   Chief Complaint Reviewed and Verified  Nursing Notes  Reviewed and   Verified  Tobacco Reviewed  Allergies Reviewed  Medications Reviewed    Medical History Reviewed  Surgical History Reviewed  Family History   Reviewed  Social History Reviewed         Tobacco:  He has never smoked tobacco.    Current Medications[1]    PHQ-2 SCORE: 0  , done 4/30/2025   Last [place] Suicide Screening on 4/30/2025 was No Risk.       Review of Systems:  Pertinent items are noted in HPI.  A comprehensive review of systems was negative.      Objective:   BP (!) 124/92   Pulse 67   Temp 96.8 °F (36 °C) (Temporal)   Resp 14   Ht 72\"   Wt (!) 310 lb (140.6 kg)   SpO2 97%   BMI 42.04 kg/m²  Estimated body mass index is 42.04 kg/m² as calculated from the following:    Height as of this encounter: 72\".    Weight as of this encounter: 310 lb (140.6 kg).  Results  LABS  PSA: 0.7 (12/2022)    RADIOLOGY  Heart CT: Score 5 (2019)     Physical Exam  VITALS: BP- 124/92  MEASUREMENTS: Weight- 310, BMI- 42.0.  GENERAL: Alert, cooperative, well developed, no acute distress.  HEENT: Normocephalic, normal oropharynx, moist mucous membranes, ears normal, throat normal.  CHEST: Clear to auscultation bilaterally, no wheezes, rhonchi, or crackles.  CARDIOVASCULAR: Normal heart rate and rhythm, S1 and S2 normal without murmurs.  ABDOMEN: Soft, non-tender, non-distended, without organomegaly, normal bowel sounds.  EXTREMITIES: No cyanosis or edema.  MUSCULOSKELETAL: Hip normal.  NEUROLOGICAL: Cranial nerves grossly intact, moves all extremities without gross motor or sensory deficit.      Assessment & Plan:   1. Routine general medical examination at a health care facility (Primary)  -     Lipid Panel  -     Comp Metabolic Panel (14)  -     CBC With Differential With Platelet  -     PSA, Total W Reflex To Free  -     TSH W Reflex To Free T4  2. Pure hypercholesterolemia  -     Atorvastatin Calcium; Take 1 tablet (10 mg total) by mouth every evening.  Dispense: 90 tablet; Refill: 1  3. Chronic  depression  -     Escitalopram Oxalate; Take 1 tablet (20 mg total) by mouth daily.  Dispense: 90 tablet; Refill: 1  4. Class 3 severe obesity due to excess calories without serious comorbidity with body mass index (BMI) of 40.0 to 44.9 in adult  -     Ozempic (0.25 or 0.5 MG/DOSE); Inject 0.25 mg into the skin once a week.  Dispense: 3 mL; Refill: 0  5. Vitamin D deficiency  -     Vitamin D, 25-Hydroxy  6. Elevated blood sugar  -     Hemoglobin A1C  7. Ischemic heart disease screen  -     CT CALCIUM SCORING; Future; Expected date: 04/30/2025    Assessment & Plan  Groin muscle strain  Pain in the groin area, likely due to muscle strain, exacerbated by prolonged sitting and certain movements. No signs of hernia.  - Provide stretching exercises for the groin and hip area  - Recommend standing frequently to avoid prolonged sitting  - Consider short course of NSAIDs like Aleve or ibuprofen for pain management    Obesity  BMI is 42. Discussed weight management strategies including intermittent fasting and physical activity. Explored pharmacological options for weight loss, specifically Ozempic and Wegovy, with insurance coverage considerations. Discussed potential benefits of Ozempic for weight management and the need for prior authorization due to insurance limitations. Considered Wegovy as an alternative if Ozempic is not covered.  - Submit prior authorization for Ozempic  - Encourage continuation of intermittent fasting and physical activity  - Consider standing desk to reduce sedentary time    Elevated blood sugar  Previous elevated blood sugar noted in 2019. Monitoring required to prevent progression to diabetes.  - Include hemoglobin A1c in blood work to monitor blood sugar levels    Hypertension, borderline  Blood pressure recorded at 124/92 mmHg. Systolic pressure is within normal range, but diastolic is borderline elevated.    Vitamin D deficiency  Low vitamin D levels. Currently taking two capsules of vitamin D  daily. Monitoring required to assess current status.  - Include vitamin D level in blood work to assess current status    Wellness Visit  Routine wellness visit conducted. Discussed general health maintenance, including blood work and vaccinations. Blood work to include PSA, vitamin D, and hemoglobin A1c.  - Order complete blood work including PSA, vitamin D, and hemoglobin A1c  - Administer pneumonia vaccine if available        No follow-ups on file.        Kameron Peres MD, 5/2/2025, 9:40 AM             [1]   Current Outpatient Medications   Medication Sig Dispense Refill    atorvastatin 10 MG Oral Tab Take 1 tablet (10 mg total) by mouth every evening. 90 tablet 1    escitalopram 20 MG Oral Tab Take 1 tablet (20 mg total) by mouth daily. 90 tablet 1    semaglutide (OZEMPIC, 0.25 OR 0.5 MG/DOSE,) 2 MG/3ML Subcutaneous Solution Pen-injector Inject 0.25 mg into the skin once a week. 3 mL 0    Ergocalciferol (VITAMIN D OR) Take 1 capsule by mouth daily.      semaglutide (OZEMPIC, 0.25 OR 0.5 MG/DOSE,) 2 MG/1.5ML Subcutaneous Solution Pen-injector Inject 0.25 mg into the skin once a week. (Patient not taking: Reported on 4/30/2025) 4 each 0    Tirzepatide-Weight Management (ZEPBOUND) 2.5 MG/0.5ML Subcutaneous Solution Auto-injector Inject 1 Application into the skin once a week. (Patient not taking: Reported on 4/30/2025) 2 mL 0

## 2025-05-21 ENCOUNTER — TELEPHONE (OUTPATIENT)
Dept: FAMILY MEDICINE CLINIC | Facility: CLINIC | Age: 55
End: 2025-05-21

## 2025-05-21 DIAGNOSIS — E66.813 CLASS 3 SEVERE OBESITY DUE TO EXCESS CALORIES WITHOUT SERIOUS COMORBIDITY WITH BODY MASS INDEX (BMI) OF 40.0 TO 44.9 IN ADULT: Primary | ICD-10-CM

## 2025-05-21 NOTE — TELEPHONE ENCOUNTER
Fax received from [employer] stating request for Ozempic has been denied.   Per lov note, states if ozempic is not covered could try Wegovy.     Mcm sent to pt letting him know above.    Please advise.

## 2025-05-27 DIAGNOSIS — F32.A CHRONIC DEPRESSION: ICD-10-CM

## 2025-05-27 LAB
% FREE PSA: 14 % (CALC)
ABSOLUTE BASOPHILS: 38 CELLS/UL (ref 0–200)
ABSOLUTE EOSINOPHILS: 352 CELLS/UL (ref 15–500)
ABSOLUTE LYMPHOCYTES: 2074 CELLS/UL (ref 850–3900)
ABSOLUTE MONOCYTES: 416 CELLS/UL (ref 200–950)
ABSOLUTE NEUTROPHILS: 3520 CELLS/UL (ref 1500–7800)
ALBUMIN/GLOBULIN RATIO: 1.8 (CALC) (ref 1–2.5)
ALBUMIN: 4.4 G/DL (ref 3.6–5.1)
ALKALINE PHOSPHATASE: 56 U/L (ref 35–144)
ALT: 17 U/L (ref 9–46)
AST: 15 U/L (ref 10–35)
BASOPHILS: 0.6 %
BILIRUBIN, TOTAL: 0.4 MG/DL (ref 0.2–1.2)
BUN: 14 MG/DL (ref 7–25)
CALCIUM: 9.6 MG/DL (ref 8.6–10.3)
CARBON DIOXIDE: 25 MMOL/L (ref 20–32)
CHLORIDE: 105 MMOL/L (ref 98–110)
CHOL/HDLC RATIO: 3.1 (CALC)
CHOLESTEROL, TOTAL: 174 MG/DL
CREATININE: 0.74 MG/DL (ref 0.7–1.3)
EGFR: 108 ML/MIN/1.73M2
EOSINOPHILS: 5.5 %
FREE PSA: 0.1 NG/ML
GLOBULIN: 2.5 G/DL (CALC) (ref 1.9–3.7)
GLUCOSE: 92 MG/DL (ref 65–99)
HDL CHOLESTEROL: 56 MG/DL
HEMATOCRIT: 42.5 % (ref 38.5–50)
HEMOGLOBIN A1C: 5.8 %
HEMOGLOBIN: 13.5 G/DL (ref 13.2–17.1)
LDL-CHOLESTEROL: 97 MG/DL (CALC)
LYMPHOCYTES: 32.4 %
MCH: 28 PG (ref 27–33)
MCHC: 31.8 G/DL (ref 32–36)
MCV: 88 FL (ref 80–100)
MONOCYTES: 6.5 %
MPV: 10.7 FL (ref 7.5–12.5)
NEUTROPHILS: 55 %
NON-HDL CHOLESTEROL: 118 MG/DL (CALC)
PLATELET COUNT: 259 THOUSAND/UL (ref 140–400)
POTASSIUM: 4.9 MMOL/L (ref 3.5–5.3)
PROTEIN, TOTAL: 6.9 G/DL (ref 6.1–8.1)
RDW: 13 % (ref 11–15)
RED BLOOD CELL COUNT: 4.83 MILLION/UL (ref 4.2–5.8)
SODIUM: 139 MMOL/L (ref 135–146)
TOTAL PSA: 0.7 NG/ML
TRIGLYCERIDES: 113 MG/DL
TSH W/REFLEX TO FT4: 1.34 MIU/L (ref 0.4–4.5)
VITAMIN D, 25-OH, TOTAL: 88 NG/ML (ref 30–100)
WHITE BLOOD CELL COUNT: 6.4 THOUSAND/UL (ref 3.8–10.8)

## 2025-05-28 RX ORDER — ESCITALOPRAM OXALATE 20 MG/1
20 TABLET ORAL DAILY
Qty: 90 TABLET | Refills: 1 | Status: SHIPPED | OUTPATIENT
Start: 2025-05-28

## 2025-05-28 NOTE — TELEPHONE ENCOUNTER
Name from pharmacy: ESCITALOPRAM 20MG TABLETS          Will file in chart as: ESCITALOPRAM 20 MG Oral Tab    Sig: TAKE 1 TABLET(20 MG) BY MOUTH DAILY    Disp: 90 tablet    Refills: 1 (Pharmacy requested: Not specified)    Start: 5/27/2025    Class: Normal    Non-formulary For: Chronic depression    Last ordered: 4 weeks ago (4/30/2025) by Kameron Peres MD    Last refill: 3/5/2025    Rx #: [id number]    Psychiatric Non-Scheduled (Anti-Anxiety) [id number]/27/2025 09:38 AM   Protocol Details In person appointment or virtual visit in the past 6 mos or appointment in next 3 mos    Depression Screening completed within the past 12 months    Medication is active on med list      To be filled at: [facility] #[id number] [place], [place] - [address] [facility] [address] AT [facility] OF [facility] [address], [phone], [phone]

## 2025-08-27 DIAGNOSIS — E78.00 PURE HYPERCHOLESTEROLEMIA: ICD-10-CM

## 2025-08-27 RX ORDER — ATORVASTATIN CALCIUM 10 MG/1
10 TABLET, FILM COATED ORAL EVERY EVENING
Qty: 90 TABLET | Refills: 1 | Status: SHIPPED | OUTPATIENT
Start: 2025-08-27

## (undated) DIAGNOSIS — F32.A CHRONIC DEPRESSION: ICD-10-CM

## (undated) DIAGNOSIS — Z23 NEED FOR SHINGLES VACCINE: Primary | ICD-10-CM

## (undated) DIAGNOSIS — E78.00 PURE HYPERCHOLESTEROLEMIA: ICD-10-CM

## (undated) NOTE — LETTER
10/11/18        Jody Eckert [id number]      Dear Ellie Tillman records indicate that you have outstanding lab work and or testing that was ordered for you and has not yet been completed:  Orders Placed This Encounter

## (undated) NOTE — MR AVS SNAPSHOT
After Visit Summary   3/30/2018    Jocelyn Barakat    MRN: [id number]           Visit Information     Date & Time  3/30/2018  8:00 PM Provider  [address] Dept[address]  Phone  [phone]      Allergies as of 3/3 Treatment for mild   illness or injury that does   not require immediate attention   VIDEO VISITS  Average cost  $35*    e-VISITS  Average cost  $35*      [address]  Monday - Friday  1

## (undated) NOTE — MR AVS SNAPSHOT
[address] [place] [id number] [phone]               Thank you for choosing us for your health care visit with Rashmi Collins PA-C.   We are glad to serve you and happy to provide you with this summa These medications were sent to [place] [address] [facility], [address] [place] [address], [phone], [address] [place] [address],[facility] [id number]     Phone:  [phone]    - Amoxicillin-Pot Clavulanate Start activities slowly and build up over time Do what you like   Get your heart pumping – brisk walking, biking, swimming Even 10 minute increments are effective and add up over the week   2 ½ hours per week – spread out over time Use a rohini to keep you